# Patient Record
Sex: MALE | Race: WHITE
[De-identification: names, ages, dates, MRNs, and addresses within clinical notes are randomized per-mention and may not be internally consistent; named-entity substitution may affect disease eponyms.]

---

## 2017-03-16 ENCOUNTER — PRIOR ORIGINAL RECORDS (OUTPATIENT)
Dept: OTHER | Age: 62
End: 2017-03-16

## 2017-06-06 ENCOUNTER — PRIOR ORIGINAL RECORDS (OUTPATIENT)
Dept: OTHER | Age: 62
End: 2017-06-06

## 2017-06-08 ENCOUNTER — PRIOR ORIGINAL RECORDS (OUTPATIENT)
Dept: OTHER | Age: 62
End: 2017-06-08

## 2017-08-31 ENCOUNTER — PRIOR ORIGINAL RECORDS (OUTPATIENT)
Dept: OTHER | Age: 62
End: 2017-08-31

## 2017-09-19 LAB
ALT (SGPT): 47 U/L
AST (SGOT): 33 U/L
BUN: 11 MG/DL
BUN: 16 MG/DL
BUN: 20 MG/DL
CALCIUM: 9.4 MG/DL
CALCIUM: 9.4 MG/DL
CHLORIDE: 101 MEQ/L
CHLORIDE: 106 MEQ/L
CHOLESTEROL, TOTAL: 149 MG/DL
CREATININE, SERUM: 0.78 MG/DL
CREATININE, SERUM: 0.83 MG/DL
CREATININE, SERUM: 0.93 MG/DL
GLUCOSE: 118 MG/DL
GLUCOSE: 118 MG/DL
GLUCOSE: 172 MG/DL
HDL CHOLESTEROL: 34 MG/DL
HEMATOCRIT: 41.6 %
HEMATOCRIT: 42.1 %
HEMOGLOBIN: 14.4 G/DL
HEMOGLOBIN: 14.6 G/DL
LDL CHOLESTEROL: 82 MG/DL
PLATELETS: 239 K/UL
PLATELETS: 246 K/UL
POTASSIUM, SERUM: 4 MEQ/L
POTASSIUM, SERUM: 4.5 MEQ/L
RED BLOOD COUNT: 4.46 X 10-6/U
RED BLOOD COUNT: 4.48 X 10-6/U
SODIUM: 139 MEQ/L
SODIUM: 142 MEQ/L
TOTAL CHOLESTEROL / HDL RATIO: 4.4 RATIO UN
TRIGLYCERIDES: 163 MG/DL
WHITE BLOOD COUNT: 7.06 X 10-3/U
WHITE BLOOD COUNT: 7.25 X 10-3/U

## 2017-09-28 ENCOUNTER — MYAURORA ACCOUNT LINK (OUTPATIENT)
Dept: OTHER | Age: 62
End: 2017-09-28

## 2017-10-06 ENCOUNTER — PRIOR ORIGINAL RECORDS (OUTPATIENT)
Dept: OTHER | Age: 62
End: 2017-10-06

## 2017-10-11 PROBLEM — M75.81 ROTATOR CUFF TENDONITIS, RIGHT: Status: ACTIVE | Noted: 2017-10-11

## 2017-10-11 PROBLEM — M75.01 ADHESIVE CAPSULITIS OF RIGHT SHOULDER: Status: ACTIVE | Noted: 2017-10-11

## 2017-10-12 ENCOUNTER — PRIOR ORIGINAL RECORDS (OUTPATIENT)
Dept: OTHER | Age: 62
End: 2017-10-12

## 2017-10-12 ENCOUNTER — MYAURORA ACCOUNT LINK (OUTPATIENT)
Dept: OTHER | Age: 62
End: 2017-10-12

## 2019-01-15 PROBLEM — G89.4 CHRONIC PAIN DISORDER: Status: ACTIVE | Noted: 2019-01-15

## 2019-01-15 PROBLEM — R03.0 ELEVATED BP WITHOUT DIAGNOSIS OF HYPERTENSION: Status: ACTIVE | Noted: 2019-01-15

## 2019-01-15 PROBLEM — E66.3 OVERWEIGHT (BMI 25.0-29.9): Status: ACTIVE | Noted: 2019-01-15

## 2019-01-15 PROBLEM — E78.2 MIXED HYPERLIPIDEMIA: Status: ACTIVE | Noted: 2019-01-15

## 2019-01-15 PROBLEM — M62.838 MUSCLE SPASM: Status: ACTIVE | Noted: 2019-01-15

## 2019-01-16 PROBLEM — F17.210 TOBACCO DEPENDENCE DUE TO CIGARETTES: Status: ACTIVE | Noted: 2019-01-16

## 2019-01-16 PROBLEM — I73.9 PERIPHERAL VASCULAR DISEASE: Status: ACTIVE | Noted: 2019-01-16

## 2019-01-16 PROBLEM — I73.9 PERIPHERAL VASCULAR DISEASE (HCC): Status: ACTIVE | Noted: 2019-01-16

## 2019-01-23 ENCOUNTER — TELEPHONE (OUTPATIENT)
Dept: WOUND CARE | Facility: HOSPITAL | Age: 64
End: 2019-01-23

## 2019-01-23 DIAGNOSIS — E78.2 MIXED HYPERLIPIDEMIA: Primary | ICD-10-CM

## 2019-01-23 RX ORDER — ACETAMINOPHEN 160 MG
2000 TABLET,DISINTEGRATING ORAL DAILY
Qty: 30 CAPSULE | Refills: 2 | Status: SHIPPED | OUTPATIENT
Start: 2019-01-23 | End: 2019-04-23

## 2019-01-23 RX ORDER — ROSUVASTATIN CALCIUM 10 MG/1
10 TABLET, COATED ORAL NIGHTLY
Qty: 30 TABLET | Refills: 2 | Status: SHIPPED | OUTPATIENT
Start: 2019-01-23 | End: 2019-04-23 | Stop reason: WASHOUT

## 2019-01-23 RX ORDER — UBIDECARENONE 200 MG
CAPSULE ORAL
Qty: 30 CAPSULE | Refills: 2 | Status: SHIPPED | OUTPATIENT
Start: 2019-01-23 | End: 2021-04-20 | Stop reason: ALTCHOICE

## 2019-02-28 VITALS
SYSTOLIC BLOOD PRESSURE: 134 MMHG | BODY MASS INDEX: 26.07 KG/M2 | HEART RATE: 56 BPM | DIASTOLIC BLOOD PRESSURE: 64 MMHG | HEIGHT: 68 IN | WEIGHT: 172 LBS

## 2019-02-28 VITALS
SYSTOLIC BLOOD PRESSURE: 130 MMHG | HEIGHT: 68 IN | HEART RATE: 72 BPM | BODY MASS INDEX: 25.76 KG/M2 | WEIGHT: 170 LBS | DIASTOLIC BLOOD PRESSURE: 70 MMHG

## 2021-04-20 PROBLEM — I10 ESSENTIAL HYPERTENSION: Status: ACTIVE | Noted: 2021-04-20

## 2021-04-23 ENCOUNTER — IMMUNIZATION (OUTPATIENT)
Dept: LAB | Age: 66
End: 2021-04-23
Attending: HOSPITALIST
Payer: MEDICARE

## 2021-04-23 ENCOUNTER — HOSPITAL ENCOUNTER (OUTPATIENT)
Dept: CT IMAGING | Facility: HOSPITAL | Age: 66
Discharge: HOME | End: 2021-04-23
Attending: INTERNAL MEDICINE
Payer: MEDICARE

## 2021-04-23 DIAGNOSIS — Z12.2 ENCOUNTER FOR SCREENING FOR LUNG CANCER: ICD-10-CM

## 2021-04-23 DIAGNOSIS — Z23 NEED FOR VACCINATION: Primary | ICD-10-CM

## 2021-04-23 DIAGNOSIS — F17.200 CURRENT SMOKER: ICD-10-CM

## 2021-04-23 PROCEDURE — 71271 CT THORAX LUNG CANCER SCR C-: CPT | Performed by: INTERNAL MEDICINE

## 2021-04-23 PROCEDURE — 0001A SARSCOV2 VAC 30MCG/0.3ML IM: CPT

## 2021-05-14 ENCOUNTER — IMMUNIZATION (OUTPATIENT)
Dept: LAB | Age: 66
End: 2021-05-14
Attending: HOSPITALIST
Payer: MEDICARE

## 2021-05-14 DIAGNOSIS — Z23 NEED FOR VACCINATION: Primary | ICD-10-CM

## 2021-05-14 PROCEDURE — 0002A SARSCOV2 VAC 30MCG/0.3ML IM: CPT

## 2021-09-24 ENCOUNTER — OFFICE VISIT (OUTPATIENT)
Dept: SURGERY | Facility: CLINIC | Age: 66
End: 2021-09-24
Payer: COMMERCIAL

## 2021-09-24 DIAGNOSIS — R79.89 LOW TESTOSTERONE: ICD-10-CM

## 2021-09-24 DIAGNOSIS — N52.9 ERECTILE DYSFUNCTION, UNSPECIFIED ERECTILE DYSFUNCTION TYPE: ICD-10-CM

## 2021-09-24 DIAGNOSIS — R82.90 URINE FINDING: Primary | ICD-10-CM

## 2021-09-24 LAB
APPEARANCE: CLEAR
BILIRUBIN: NEGATIVE
GLUCOSE (URINE DIPSTICK): NEGATIVE MG/DL
KETONES (URINE DIPSTICK): NEGATIVE MG/DL
LEUKOCYTES: NEGATIVE
MULTISTIX LOT#: NORMAL NUMERIC
NITRITE, URINE: NEGATIVE
OCCULT BLOOD: NEGATIVE
PH, URINE: 7 (ref 4.5–8)
PROTEIN (URINE DIPSTICK): NEGATIVE MG/DL
SPECIFIC GRAVITY: 1.02 (ref 1–1.03)
URINE-COLOR: YELLOW
UROBILINOGEN,SEMI-QN: 0.2 MG/DL (ref 0–1.9)

## 2021-09-24 PROCEDURE — 99203 OFFICE O/P NEW LOW 30 MIN: CPT | Performed by: UROLOGY

## 2021-09-24 PROCEDURE — 81003 URINALYSIS AUTO W/O SCOPE: CPT | Performed by: UROLOGY

## 2021-09-24 NOTE — PROGRESS NOTES
Rooming Clinician: Gabriel Leon is a 61year old male. Patient presents with:  Consult: referred by pcp for low T 225        HPI:     Patient comes for evaluation of low energy and erectile dysfunction.   He states a history of low testosterone many 2017    Right calf   • LEG/ANKLE SURGERY PROC UNLISTED Right 1990's    Attached ligaments with anchor      Social History:  Social History    Tobacco Use      Smoking status: Current Every Day Smoker        Packs/day: 1.00        Years: 42.00        Pack y that his biggest issue was low energy and would like to resume testosterone replacement therapy. We discussed various options. He will initiate self injection therapy using 200 mg IM twice monthly. I discussed the testosterone levels with the patient.

## 2021-09-24 NOTE — PATIENT INSTRUCTIONS
Evaluating Erectile Dysfunction     Doctor talking to man. Many men feel embarrassed to talk to a healthcare provider about erectile dysfunction (ED). This common problem can be treated, but only if your provider knows about it.  Your provider will like And counseling may be recommended to talk about underlying emotional issues. Tera last reviewed this educational content on 7/1/2019  © 7468-4369 [employer]. All rights reserved.  This information is not intended as a substitute for pro professional medical care. Always follow your healthcare professional's instructions. Total Testosterone  Does this test have other names?   Testosterone (total), serum testosterone  What is this test?  This test measures the level of the hormone nathan hypogonadism. The FDA currently advises against treating men with low testosterone caused only by aging. Why do I need this test?  You may need this test if you have symptoms of low testosterone.   Symptoms of low testosterone in men include:  · Large sammie morning. This is because testosterone levels tend to be highest at that time. But you may need to have this test more than once, and at different times of the day, to confirm low testosterone levels.  This is because your testosterone level can change from

## 2021-09-27 ENCOUNTER — TELEPHONE (OUTPATIENT)
Dept: SURGERY | Facility: CLINIC | Age: 66
End: 2021-09-27

## 2021-09-27 NOTE — TELEPHONE ENCOUNTER
This RN faxed the order of Testosterone Cypionate to [facility] on 9/27/21:     Testosterone Cypionate 200mg/ml  To inject 1ml IM twice a month  Syringes 22G 1 1/2  Refills: 6

## 2022-01-06 ENCOUNTER — IMMUNIZATION (OUTPATIENT)
Dept: LAB | Facility: HOSPITAL | Age: 67
End: 2022-01-06
Attending: EMERGENCY MEDICINE
Payer: MEDICARE

## 2022-01-06 DIAGNOSIS — Z23 NEED FOR VACCINATION: Primary | ICD-10-CM

## 2022-01-06 PROCEDURE — 0054A SARSCOV2 VAC 30MCG/0.3ML IM: CPT

## 2022-01-06 PROCEDURE — 0004A SARSCOV2 VAC 30MCG/0.3ML IM: CPT

## 2022-04-28 ENCOUNTER — TELEPHONE (OUTPATIENT)
Dept: SURGERY | Facility: CLINIC | Age: 67
End: 2022-04-28

## 2022-04-28 NOTE — TELEPHONE ENCOUNTER
Received refill request from [facility] for testosterone. LOV visit was 9/24/2021 and it was rec that he have a 3 month f/u.  mcm sent to the patient encouraging him to make f/u appt and refill can be reassessed.

## 2022-04-30 ENCOUNTER — HOSPITAL ENCOUNTER (OUTPATIENT)
Dept: CT IMAGING | Facility: HOSPITAL | Age: 67
Discharge: HOME | End: 2022-04-30
Attending: INTERNAL MEDICINE
Payer: MEDICARE

## 2022-04-30 DIAGNOSIS — Z72.0 TOBACCO USE: ICD-10-CM

## 2022-04-30 DIAGNOSIS — F17.200 CURRENT SMOKER: ICD-10-CM

## 2022-04-30 PROCEDURE — 71271 CT THORAX LUNG CANCER SCR C-: CPT | Performed by: INTERNAL MEDICINE

## 2022-05-05 NOTE — TELEPHONE ENCOUNTER
Received fax for Rx refill request 90 day supply for the patient         Testosterone cypionate injectable 200 MG/ML Intramuscular Solution, QUANTITY 10  INJECT 1 ML INTRAMUSCULARLY TWICE A MONTH    Special instructions:  10 SYRN 22 G 1 1/2 AND 10 DRAW 18 G

## 2022-05-06 RX ORDER — TESTOSTERONE CYPIONATE 200 MG/ML
200 INJECTION INTRAMUSCULAR
Qty: 10 ML | Refills: 0 | Status: SHIPPED | OUTPATIENT
Start: 2022-05-06

## 2022-06-03 ENCOUNTER — OFFICE VISIT (OUTPATIENT)
Dept: SURGERY | Facility: CLINIC | Age: 67
End: 2022-06-03
Payer: COMMERCIAL

## 2022-06-03 DIAGNOSIS — R82.90 URINE FINDING: Primary | ICD-10-CM

## 2022-06-03 DIAGNOSIS — N52.9 ERECTILE DYSFUNCTION, UNSPECIFIED ERECTILE DYSFUNCTION TYPE: ICD-10-CM

## 2022-06-03 DIAGNOSIS — R79.89 LOW TESTOSTERONE: ICD-10-CM

## 2022-06-03 LAB
APPEARANCE: CLEAR
BILIRUBIN: NEGATIVE
GLUCOSE (URINE DIPSTICK): NEGATIVE MG/DL
KETONES (URINE DIPSTICK): NEGATIVE MG/DL
LEUKOCYTES: NEGATIVE
MULTISTIX LOT#: ABNORMAL NUMERIC
NITRITE, URINE: NEGATIVE
PH, URINE: 5.5 (ref 4.5–8)
PROTEIN (URINE DIPSTICK): 30 MG/DL
SPECIFIC GRAVITY: >=1.03 (ref 1–1.03)
UROBILINOGEN,SEMI-QN: 1 MG/DL (ref 0–1.9)

## 2022-06-03 PROCEDURE — 99213 OFFICE O/P EST LOW 20 MIN: CPT | Performed by: UROLOGY

## 2022-06-03 PROCEDURE — 81003 URINALYSIS AUTO W/O SCOPE: CPT | Performed by: UROLOGY

## 2022-09-09 ENCOUNTER — HOSPITAL ENCOUNTER (OUTPATIENT)
Dept: MRI IMAGING | Age: 67
Discharge: HOME | End: 2022-09-09
Attending: INTERNAL MEDICINE
Payer: MEDICARE

## 2022-09-09 DIAGNOSIS — R51.9 CHRONIC INTRACTABLE HEADACHE, UNSPECIFIED HEADACHE TYPE: ICD-10-CM

## 2022-09-09 DIAGNOSIS — G89.29 CHRONIC INTRACTABLE HEADACHE, UNSPECIFIED HEADACHE TYPE: ICD-10-CM

## 2022-09-09 PROCEDURE — 70551 MRI BRAIN STEM W/O DYE: CPT | Performed by: INTERNAL MEDICINE

## 2022-10-14 PROCEDURE — 99284 EMERGENCY DEPT VISIT MOD MDM: CPT

## 2022-10-14 PROCEDURE — 99283 EMERGENCY DEPT VISIT LOW MDM: CPT

## 2022-10-15 ENCOUNTER — HOSPITAL ENCOUNTER (EMERGENCY)
Facility: HOSPITAL | Age: 67
Discharge: HOME | End: 2022-10-15
Attending: EMERGENCY MEDICINE
Payer: MEDICARE

## 2022-10-15 VITALS
BODY MASS INDEX: 27 KG/M2 | DIASTOLIC BLOOD PRESSURE: 57 MMHG | HEART RATE: 66 BPM | TEMPERATURE: 98 F | SYSTOLIC BLOOD PRESSURE: 133 MMHG | RESPIRATION RATE: 18 BRPM | WEIGHT: 176 LBS | OXYGEN SATURATION: 97 %

## 2022-10-15 DIAGNOSIS — M54.2 NECK PAIN: Primary | ICD-10-CM

## 2022-10-15 PROCEDURE — 96372 THER/PROPH/DIAG INJ SC/IM: CPT

## 2022-10-15 RX ORDER — KETOROLAC TROMETHAMINE 30 MG/ML
30 INJECTION, SOLUTION INTRAMUSCULAR; INTRAVENOUS ONCE
Status: COMPLETED | OUTPATIENT
Start: 2022-10-15 | End: 2022-10-15

## 2022-10-15 RX ORDER — MORPHINE SULFATE 4 MG/ML
4 INJECTION, SOLUTION INTRAMUSCULAR; INTRAVENOUS ONCE
Status: COMPLETED | OUTPATIENT
Start: 2022-10-15 | End: 2022-10-15

## 2022-10-15 NOTE — ED INITIAL ASSESSMENT (HPI)
Pt had cervical spine procedure a month ago, has been seeing pain specialist. Seen pain dr today and was prescribed medication, although reports has been unable to  rx form pharm

## 2022-10-17 ENCOUNTER — PATIENT OUTREACH (OUTPATIENT)
Dept: CASE MANAGEMENT | Age: 67
End: 2022-10-17

## 2022-10-17 NOTE — PROGRESS NOTES
VM received; pt spouse, Henrik Woods requesting assistance w/scheduling apt (dc 10/15)    Dr David Erwin, Rehabilitation  [address] [place] [address]  [phone]  Follow up 1 week

## 2022-10-17 NOTE — PROGRESS NOTES
Dr Gigi Eldridge, Rehabilitation  [address] [place] [id number] [address] [address]   [phone]  Follow up 1 week  [phone]  Apt: Nov 11 @ 3pm   Put on waitlist    Confirmed w/ pt wife Edward Alicia encounter

## 2023-01-12 RX ORDER — TESTOSTERONE CYPIONATE 200 MG/ML
200 INJECTION INTRAMUSCULAR
Qty: 10 ML | Refills: 0 | Status: SHIPPED | OUTPATIENT
Start: 2023-01-12

## 2023-01-12 NOTE — TELEPHONE ENCOUNTER
RN received a refill request of Testosterone Cyp 200mg (twice a month) from [facility]. Forwarded to PATEJA to review and approve    Patient's last office visit, 6/3. RN reached out to patient via [employer] to set up appt with Dr Keny Yip and lab draw due.

## 2023-01-17 ENCOUNTER — TELEPHONE (OUTPATIENT)
Dept: SURGERY | Facility: CLINIC | Age: 68
End: 2023-01-17

## 2023-01-17 DIAGNOSIS — R79.89 LOW TESTOSTERONE: Primary | ICD-10-CM

## 2023-01-17 NOTE — TELEPHONE ENCOUNTER
RN received letter from [facility] for Testosterone Cyp 100mg refill request. MARIJA suggested lab draws first before refills.  Patient made aware via [employer]

## 2023-02-13 LAB
% FREE PSA: 67 % (CALC)
ABSOLUTE BASOPHILS: 62 CELLS/UL (ref 0–200)
ABSOLUTE EOSINOPHILS: 405 CELLS/UL (ref 15–500)
ABSOLUTE LYMPHOCYTES: 2746 CELLS/UL (ref 850–3900)
ABSOLUTE MONOCYTES: 563 CELLS/UL (ref 200–950)
ABSOLUTE NEUTROPHILS: 5025 CELLS/UL (ref 1500–7800)
BASOPHILS: 0.7 %
EOSINOPHILS: 4.6 %
FREE PSA: 0.6 NG/ML
HEMATOCRIT: 49.3 % (ref 38.5–50)
HEMOGLOBIN: 16.9 G/DL (ref 13.2–17.1)
LYMPHOCYTES: 31.2 %
MCH: 33.6 PG (ref 27–33)
MCHC: 34.3 G/DL (ref 32–36)
MCV: 98 FL (ref 80–100)
MONOCYTES: 6.4 %
MPV: 9.8 FL (ref 7.5–12.5)
NEUTROPHILS: 57.1 %
PLATELET COUNT: 276 THOUSAND/UL (ref 140–400)
RDW: 13 % (ref 11–15)
RED BLOOD CELL COUNT: 5.03 MILLION/UL (ref 4.2–5.8)
TOTAL PSA: 0.9 NG/ML
WHITE BLOOD CELL COUNT: 8.8 THOUSAND/UL (ref 3.8–10.8)

## 2023-05-06 ENCOUNTER — HOSPITAL ENCOUNTER (OUTPATIENT)
Dept: CT IMAGING | Facility: HOSPITAL | Age: 68
Discharge: HOME | End: 2023-05-06
Attending: INTERNAL MEDICINE
Payer: MEDICARE

## 2023-05-06 DIAGNOSIS — Z12.2 ENCOUNTER FOR SCREENING FOR LUNG CANCER: ICD-10-CM

## 2023-05-06 DIAGNOSIS — F17.200 SMOKER: ICD-10-CM

## 2023-05-06 PROCEDURE — 71271 CT THORAX LUNG CANCER SCR C-: CPT | Performed by: INTERNAL MEDICINE

## 2023-08-18 ENCOUNTER — HOSPITAL ENCOUNTER (OUTPATIENT)
Dept: MRI IMAGING | Facility: HOSPITAL | Age: 68
Discharge: HOME | End: 2023-08-18
Attending: ANESTHESIOLOGY
Payer: MEDICARE

## 2023-08-18 DIAGNOSIS — M51.34 DEGENERATIVE DISC DISEASE, THORACIC: ICD-10-CM

## 2023-08-18 PROCEDURE — 72156 MRI NECK SPINE W/O & W/DYE: CPT | Performed by: ANESTHESIOLOGY

## 2023-08-18 PROCEDURE — A9575 INJ GADOTERATE MEGLUMI 0.1ML: HCPCS | Performed by: RADIOLOGY

## 2023-08-18 PROCEDURE — 72157 MRI CHEST SPINE W/O & W/DYE: CPT | Performed by: ANESTHESIOLOGY

## 2023-08-18 RX ORDER — GADOTERATE MEGLUMINE 376.9 MG/ML
15 INJECTION INTRAVENOUS
Status: COMPLETED | OUTPATIENT
Start: 2023-08-18 | End: 2023-08-18

## 2023-08-18 RX ADMIN — GADOTERATE MEGLUMINE 14 ML: 376.9 INJECTION INTRAVENOUS at 14:11:00

## 2023-11-20 ENCOUNTER — HOSPITAL ENCOUNTER (OUTPATIENT)
Dept: ULTRASOUND IMAGING | Age: 68
Discharge: HOME | End: 2023-11-20
Attending: INTERNAL MEDICINE
Payer: MEDICARE

## 2023-11-20 ENCOUNTER — LAB ENCOUNTER (OUTPATIENT)
Dept: LAB | Age: 68
End: 2023-11-20
Attending: INTERNAL MEDICINE
Payer: MEDICARE

## 2023-11-20 DIAGNOSIS — R09.89 RIGHT CAROTID BRUIT: ICD-10-CM

## 2023-11-20 DIAGNOSIS — D64.9 ANEMIA, UNSPECIFIED TYPE: ICD-10-CM

## 2023-11-20 DIAGNOSIS — E78.2 MIXED HYPERLIPIDEMIA: ICD-10-CM

## 2023-11-20 DIAGNOSIS — E03.9 PRIMARY HYPOTHYROIDISM: ICD-10-CM

## 2023-11-20 DIAGNOSIS — R73.9 HYPERGLYCEMIA: ICD-10-CM

## 2023-11-20 LAB
ALBUMIN SERPL-MCNC: 3.7 G/DL (ref 3.4–5)
ALBUMIN/GLOB SERPL: 1.2 {RATIO} (ref 1–2)
ALP LIVER SERPL-CCNC: 43 U/L
ALT SERPL-CCNC: 37 U/L
ANION GAP SERPL CALC-SCNC: 2 MMOL/L (ref 0–18)
AST SERPL-CCNC: 25 U/L (ref 15–37)
BASOPHILS # BLD AUTO: 0.08 X10(3) UL (ref 0–0.2)
BASOPHILS NFR BLD AUTO: 0.9 %
BILIRUB SERPL-MCNC: 0.7 MG/DL (ref 0.1–2)
BUN BLD-MCNC: 16 MG/DL (ref 9–23)
CALCIUM BLD-MCNC: 9 MG/DL (ref 8.5–10.1)
CHLORIDE SERPL-SCNC: 109 MMOL/L (ref 98–112)
CHOLEST SERPL-MCNC: 133 MG/DL (ref ?–200)
CO2 SERPL-SCNC: 30 MMOL/L (ref 21–32)
CREAT BLD-MCNC: 1.03 MG/DL
EGFRCR SERPLBLD CKD-EPI 2021: 80 ML/MIN/1.73M2 (ref 60–?)
EOSINOPHIL # BLD AUTO: 0.52 X10(3) UL (ref 0–0.7)
EOSINOPHIL NFR BLD AUTO: 6.1 %
ERYTHROCYTE [DISTWIDTH] IN BLOOD BY AUTOMATED COUNT: 13 %
EST. AVERAGE GLUCOSE BLD GHB EST-MCNC: 120 MG/DL (ref 68–126)
FASTING PATIENT LIPID ANSWER: YES
FASTING STATUS PATIENT QL REPORTED: YES
GLOBULIN PLAS-MCNC: 3 G/DL (ref 2.8–4.4)
GLUCOSE BLD-MCNC: 105 MG/DL (ref 70–99)
HBA1C MFR BLD: 5.8 % (ref ?–5.7)
HCT VFR BLD AUTO: 47.1 %
HDLC SERPL-MCNC: 28 MG/DL (ref 40–59)
HGB BLD-MCNC: 15.8 G/DL
IMM GRANULOCYTES # BLD AUTO: 0.02 X10(3) UL (ref 0–1)
IMM GRANULOCYTES NFR BLD: 0.2 %
LDLC SERPL CALC-MCNC: 79 MG/DL (ref ?–100)
LYMPHOCYTES # BLD AUTO: 2.83 X10(3) UL (ref 1–4)
LYMPHOCYTES NFR BLD AUTO: 33 %
MCH RBC QN AUTO: 32 PG (ref 26–34)
MCHC RBC AUTO-ENTMCNC: 33.5 G/DL (ref 31–37)
MCV RBC AUTO: 95.5 FL
MONOCYTES # BLD AUTO: 0.57 X10(3) UL (ref 0.1–1)
MONOCYTES NFR BLD AUTO: 6.7 %
NEUTROPHILS # BLD AUTO: 4.55 X10 (3) UL (ref 1.5–7.7)
NEUTROPHILS # BLD AUTO: 4.55 X10(3) UL (ref 1.5–7.7)
NEUTROPHILS NFR BLD AUTO: 53.1 %
NONHDLC SERPL-MCNC: 105 MG/DL (ref ?–130)
OSMOLALITY SERPL CALC.SUM OF ELEC: 294 MOSM/KG (ref 275–295)
PLATELET # BLD AUTO: 232 10(3)UL (ref 150–450)
POTASSIUM SERPL-SCNC: 3.9 MMOL/L (ref 3.5–5.1)
PROT SERPL-MCNC: 6.7 G/DL (ref 6.4–8.2)
RBC # BLD AUTO: 4.93 X10(6)UL
SODIUM SERPL-SCNC: 141 MMOL/L (ref 136–145)
TRIGL SERPL-MCNC: 148 MG/DL (ref 30–149)
TSI SER-ACNC: 1.04 MIU/ML (ref 0.36–3.74)
VLDLC SERPL CALC-MCNC: 23 MG/DL (ref 0–30)
WBC # BLD AUTO: 8.6 X10(3) UL (ref 4–11)

## 2023-11-20 PROCEDURE — 80053 COMPREHEN METABOLIC PANEL: CPT

## 2023-11-20 PROCEDURE — 83036 HEMOGLOBIN GLYCOSYLATED A1C: CPT

## 2023-11-20 PROCEDURE — 85025 COMPLETE CBC W/AUTO DIFF WBC: CPT

## 2023-11-20 PROCEDURE — 80061 LIPID PANEL: CPT

## 2023-11-20 PROCEDURE — 84443 ASSAY THYROID STIM HORMONE: CPT

## 2023-11-20 PROCEDURE — 93880 EXTRACRANIAL BILAT STUDY: CPT | Performed by: INTERNAL MEDICINE

## 2023-11-21 NOTE — PROGRESS NOTES
I will discuss results with the patient in person at the next visit- Appt scheduled - date verified by me.  Oralia Rizo,

## 2023-12-01 ENCOUNTER — TELEPHONE (OUTPATIENT)
Dept: SURGERY | Facility: HOSPITAL | Age: 68
End: 2023-12-01

## 2023-12-01 NOTE — TELEPHONE ENCOUNTER
ASSESSMENT AND PLAN:  Esther Reddy is a 79year old male with HCV. Noted HCV Ab+ at blood donation. History of HCV with IFN Riba Rx in the past. States was treated. Has normal liver enzymes. May have false positive HCV Ab seen after Rx. Will check HCV PCR and plan Rx if positive cw relapse. 1. Check HCV PCR, Fibrosure. 2. If negative then cw prior HCV Rx and no chronic HCV. 3. If negative, will make plans for therapy.

## 2023-12-05 ENCOUNTER — TELEPHONE (OUTPATIENT)
Dept: SURGERY | Facility: HOSPITAL | Age: 68
End: 2023-12-05

## 2023-12-05 NOTE — TELEPHONE ENCOUNTER
HCV PCR positive Genotype 3  Moderate scarring. Other labs avail in [facility]    1. Start Epclusa x 12 weeks. Rx sent to Pharmacy. 2.  Needs Ultrasound. Ordered. Call [phone] to schedule    Results given to patient.   Sent to [facility] [facility]

## 2024-09-27 PROBLEM — J41.0 SMOKERS' COUGH (HCC): Chronic | Status: ACTIVE | Noted: 2024-09-27

## 2024-10-04 ENCOUNTER — HOSPITAL ENCOUNTER (OUTPATIENT)
Dept: MRI IMAGING | Facility: HOSPITAL | Age: 69
Discharge: HOME | End: 2024-10-04
Attending: INTERNAL MEDICINE
Payer: MEDICARE

## 2024-10-04 DIAGNOSIS — R29.898 WEAKNESS OF LEFT ARM: ICD-10-CM

## 2024-10-04 DIAGNOSIS — R20.0 NUMBNESS AND TINGLING OF LEFT SIDE OF FACE: ICD-10-CM

## 2024-10-04 DIAGNOSIS — R20.2 NUMBNESS AND TINGLING OF LEFT SIDE OF FACE: ICD-10-CM

## 2024-10-04 DIAGNOSIS — I63.81 CEREBROVASCULAR ACCIDENT (CVA) DUE TO OCCLUSION OF SMALL ARTERY (HCC): ICD-10-CM

## 2024-10-04 PROCEDURE — 70551 MRI BRAIN STEM W/O DYE: CPT | Performed by: INTERNAL MEDICINE

## 2024-11-22 ENCOUNTER — HOSPITAL ENCOUNTER (OUTPATIENT)
Dept: CV DIAGNOSTICS | Facility: HOSPITAL | Age: 69
Discharge: HOME | End: 2024-11-22
Attending: INTERNAL MEDICINE
Payer: MEDICARE

## 2024-11-22 DIAGNOSIS — R06.02 SOB (SHORTNESS OF BREATH): ICD-10-CM

## 2024-11-22 PROCEDURE — 78452 HT MUSCLE IMAGE SPECT MULT: CPT | Performed by: INTERNAL MEDICINE

## 2024-11-22 PROCEDURE — 93017 CV STRESS TEST TRACING ONLY: CPT | Performed by: INTERNAL MEDICINE

## 2024-11-22 PROCEDURE — 93018 CV STRESS TEST I&R ONLY: CPT | Performed by: INTERNAL MEDICINE

## 2024-11-22 RX ORDER — REGADENOSON 0.08 MG/ML
INJECTION, SOLUTION INTRAVENOUS
Status: DISPENSED
Start: 2024-11-22 | End: 2024-11-22

## 2024-12-14 ENCOUNTER — HOSPITAL ENCOUNTER (OUTPATIENT)
Dept: GENERAL RADIOLOGY | Facility: HOSPITAL | Age: 69
Discharge: HOME | End: 2024-12-14
Attending: INTERNAL MEDICINE
Payer: MEDICARE

## 2024-12-14 ENCOUNTER — APPOINTMENT (OUTPATIENT)
Dept: CT IMAGING | Facility: HOSPITAL | Age: 69
End: 2024-12-14
Attending: INTERNAL MEDICINE
Payer: MEDICARE

## 2024-12-14 DIAGNOSIS — Z01.818 PREOP EXAMINATION: ICD-10-CM

## 2024-12-14 DIAGNOSIS — R94.39 ABNORMAL CARDIOVASCULAR STRESS TEST: ICD-10-CM

## 2024-12-14 PROCEDURE — 71046 X-RAY EXAM CHEST 2 VIEWS: CPT | Performed by: INTERNAL MEDICINE

## 2024-12-18 NOTE — PAT NURSING NOTE
PreOp Instructions     You are scheduled for: a Cardiac Procedure     Date of Procedure: 12/19/24     Diet Instructions: Do not eat or drink anything after midnight including gum, mints, candy, etc the night before your procedure.     Medications: Take Aspirin 81 mg x 4 tablets the morning of your procedure. Medications you are allowed to take can be taken with a sip of water the morning of your procedure.     Medications to Stop: DO NOT TAKE any herbal supplements and vitamins the morning of your procedure.     Other Medications: DO NOT TAKE Losartan/Hydrochlorothiazide the morning of your procedure. DO NOT TAKE Sildenafil for 24 hours prior to your procedure.     Skin Prep : Shower with antibacterial soap using a clean washcloth, prior to procedure. Once dried off, no lotions/powders/creams/ointments, etc.     Arrival Time: You will receive a phone call later today between 3:00 pm- 6:00 pm with your arrival time. If you haven't received a phone call, please check your voicemail messages., If you did not receive a voice mail and it is after 6:00 pm, please call the nursing supervisor at [phone].    Driving After Procedure: Sedation will be given so you WILL NOT be able to drive home. You will need a responsible adult  to drive you home. You can NOT take uber or taxi unless approved by your physician in advance.     Discharge Teaching: Your nurse will give you specific instructions before discharge, Most people can resume normal activities in 2-3 days, Any questions, please call the physician's office

## 2024-12-19 ENCOUNTER — HOSPITAL ENCOUNTER (OUTPATIENT)
Dept: INTERVENTIONAL RADIOLOGY/VASCULAR | Facility: HOSPITAL | Age: 69
Discharge: HOME | End: 2024-12-19
Attending: INTERNAL MEDICINE | Admitting: INTERNAL MEDICINE
Payer: MEDICARE

## 2024-12-19 VITALS
DIASTOLIC BLOOD PRESSURE: 62 MMHG | SYSTOLIC BLOOD PRESSURE: 142 MMHG | OXYGEN SATURATION: 98 % | TEMPERATURE: 98 F | RESPIRATION RATE: 12 BRPM | BODY MASS INDEX: 23.79 KG/M2 | HEIGHT: 68 IN | WEIGHT: 157 LBS | HEART RATE: 54 BPM

## 2024-12-19 DIAGNOSIS — R94.39 ABNORMAL STRESS TEST: ICD-10-CM

## 2024-12-19 PROCEDURE — B2111ZZ FLUOROSCOPY OF MULTIPLE CORONARY ARTERIES USING LOW OSMOLAR CONTRAST: ICD-10-PCS | Performed by: INTERNAL MEDICINE

## 2024-12-19 PROCEDURE — 4A023N7 MEASUREMENT OF CARDIAC SAMPLING AND PRESSURE, LEFT HEART, PERCUTANEOUS APPROACH: ICD-10-PCS | Performed by: INTERNAL MEDICINE

## 2024-12-19 PROCEDURE — 93458 L HRT ARTERY/VENTRICLE ANGIO: CPT | Performed by: INTERNAL MEDICINE

## 2024-12-19 RX ORDER — LIDOCAINE HYDROCHLORIDE 10 MG/ML
INJECTION, SOLUTION EPIDURAL; INFILTRATION; INTRACAUDAL; PERINEURAL
Status: COMPLETED
Start: 2024-12-19 | End: 2024-12-19

## 2024-12-19 RX ORDER — NITROGLYCERIN 20 MG/100ML
INJECTION INTRAVENOUS
Status: COMPLETED
Start: 2024-12-19 | End: 2024-12-19

## 2024-12-19 RX ORDER — IOPAMIDOL 755 MG/ML
200 INJECTION, SOLUTION INTRAVASCULAR
Status: COMPLETED | OUTPATIENT
Start: 2024-12-19 | End: 2024-12-19

## 2024-12-19 RX ORDER — SODIUM CHLORIDE 9 MG/ML
INJECTION, SOLUTION INTRAVENOUS CONTINUOUS
OUTPATIENT
Start: 2024-12-19 | End: 2024-12-19

## 2024-12-19 RX ORDER — HEPARIN SODIUM 5000 [USP'U]/ML
INJECTION, SOLUTION INTRAVENOUS; SUBCUTANEOUS
Status: COMPLETED
Start: 2024-12-19 | End: 2024-12-19

## 2024-12-19 RX ORDER — VERAPAMIL HYDROCHLORIDE 2.5 MG/ML
INJECTION, SOLUTION INTRAVENOUS
Status: COMPLETED
Start: 2024-12-19 | End: 2024-12-19

## 2024-12-19 RX ORDER — SODIUM CHLORIDE 9 MG/ML
INJECTION, SOLUTION INTRAVENOUS
Status: COMPLETED | OUTPATIENT
Start: 2024-12-19 | End: 2024-12-19

## 2024-12-19 RX ORDER — MIDAZOLAM HYDROCHLORIDE 1 MG/ML
INJECTION INTRAMUSCULAR; INTRAVENOUS
Status: COMPLETED
Start: 2024-12-19 | End: 2024-12-19

## 2024-12-19 RX ADMIN — SODIUM CHLORIDE: 9 INJECTION, SOLUTION INTRAVENOUS at 13:12:00

## 2024-12-19 RX ADMIN — IOPAMIDOL 30 ML: 755 INJECTION, SOLUTION INTRAVASCULAR at 17:38:00

## 2024-12-19 NOTE — INTERVAL H&P NOTE
Pre-op Diagnosis: * No pre-op diagnosis entered *    The above referenced H&P was reviewed by Kelin Downs MD on 12/19/2024, the patient was examined and no significant changes have occurred in the patient's condition since the H&P was performed.  I discussed with the patient and/or legal representative the potential benefits, risks and side effects of this procedure; the likelihood of the patient achieving goals; and potential problems that might occur during recuperation.  I discussed reasonable alternatives to the procedure, including risks, benefits and side effects related to the alternatives and risks related to not receiving this procedure.  We will proceed with procedure as planned.

## 2024-12-19 NOTE — PROCEDURES
[facility]   part of [facility]    Cardiac Cath Procedure Note  Nico Harmon Patient Status:  Outpatient    1955 MRN [id number]   Location [facility] INTERVENTIONAL SUITES Attending Kelin Downs MD   Hosp Day # 0 PCP Lasha Fong MD       Cardiologist: Kelin Downs MD  Primary Proceduralist: Kelin Downs MD  Procedure Performed: [facility]  Date of Procedure: 2024   Indication: Abnormal stress test    Consent:   Informed written consent was obtained from the patient after risk benefits and alternative explained the patient.  Patient agreed to proceed    Sedation  IV conscious sedation was achieved with Versed and fentanyl.  It was administered under my direct supervision.  Hemodynamic monitoring took place throughout the entire procedure by myself in the Cath Lab staff.  3 mg of Versed and 75mcg of Fentanyl were given.  Start Time: 17:12 End Time: 17:29      Description of the procedure: After written informed consent was obtained from the patient, patient was brought to the cardiac catheterization laboratory in a stable condition and fasting state.  Patient was prepped and draped in the usual sterile fashion.  IV conscious sedation was achieved with Versed and fentanyl.  Lidocaine 1% was used to infiltrate the right radial artery for local anesthesia and a 6 Khmer introducer sheath was inserted into the right radial artery.      Specimen sent to: No specimen collected  Estimated blood loss: 10 cc  Closure:  TR band    Left Ventriculography and hemodynamics:   LV EDP 12 mmHg  No gradient across aortic valve    Coronary Angiography  RCA:  Large size vessel, dominant, mild CAD  Left main:  Large, mild luminal irregularities  Left anterior descending:  Large size vessel, mild luminal irregularities  Circumflex:  Large, vessel, no significant obstructive CAD    Assessment:  Mild CAD    Recommendations:    Routine post cath care  Findings discussed with patient  Recommend lifestyle  modifications with diet and exercise.       Kelin Downs MD  12/19/24

## 2024-12-20 NOTE — PROGRESS NOTES
Called pt for follow up post C. Pt stated he was having chest pain. Instructed pt to call Dr Downs' office to notify him-pt verbalized understanding.

## 2024-12-20 NOTE — PLAN OF CARE
Patient had C today with Dr. Downs. Right wrist access site with TR band in place with 8cc air instilled. Site is CDI. Patient denies any numbness or tingling to right hand/fingers. Patient has good O2 pleth on right hand. VSS. Patient denies any pain. Patient tolerating po intake. Dr. Downs @ bedside and spoke with patient. Air intermittently released from TR band until all air removed. TR band removed. Site is soft, CDI, +1 radial pulse. Occlusive dressing applied. Patient completed recovery time. Discharge instructions reviewed. IV D/C'd. Patient discharged to [facility] by wheelchair with belongings. Wife is .

## 2025-02-25 PROBLEM — R42 DIZZINESS: Status: ACTIVE | Noted: 2024-12-06

## 2025-02-25 PROBLEM — R06.02 SOB (SHORTNESS OF BREATH): Status: ACTIVE | Noted: 2024-12-06

## 2025-03-01 ENCOUNTER — HOSPITAL ENCOUNTER (OUTPATIENT)
Dept: ULTRASOUND IMAGING | Facility: HOSPITAL | Age: 70
Discharge: HOME | End: 2025-03-01
Attending: INTERNAL MEDICINE
Payer: MEDICARE

## 2025-03-01 DIAGNOSIS — B18.2 CHRONIC HEPATITIS C WITHOUT HEPATIC COMA (HCC): ICD-10-CM

## 2025-03-01 PROCEDURE — 76705 ECHO EXAM OF ABDOMEN: CPT | Performed by: INTERNAL MEDICINE

## 2025-03-27 ENCOUNTER — PROCEDURE VISIT (OUTPATIENT)
Dept: PHYSICAL MEDICINE AND REHAB | Facility: CLINIC | Age: 70
End: 2025-03-27
Payer: COMMERCIAL

## 2025-03-27 DIAGNOSIS — G60.9 IDIOPATHIC PERIPHERAL NEUROPATHY: ICD-10-CM

## 2025-03-27 PROCEDURE — 95908 NRV CNDJ TST 3-4 STUDIES: CPT | Performed by: PHYSICAL MEDICINE & REHABILITATION

## 2025-03-27 PROCEDURE — 95886 MUSC TEST DONE W/N TEST COMP: CPT | Performed by: PHYSICAL MEDICINE & REHABILITATION

## 2025-03-27 NOTE — PROGRESS NOTES
[facility]  Electromyography Consultation      History of Present Illness:    Dear Dr. Fong,  Thank you for the opportunity to see Nico Harmon for electrodiagnostic consultation today. As you know the patient is a 69 year old male with a chief complaint of numbness of the legs with the feeling of walking on gravel that started from about 8 months ago to possibly several years without injury.  He drinks alcohol 1 or 2 drinks daily. He has chronic back pain on chronic opioids.  He has a long distance over the road . Denies hx of diabetes, denies thyroid issues.     PAST MEDICAL HISTORY:  Past Medical History:    Arthritis    Chronic hepatitis C (HCC)    G3  F 2-3  Rx 1990's    High blood pressure    High cholesterol    HTN (hypertension)    Peripheral vascular disease       SURGICAL HISTORY:  Past Surgical History:   Procedure Laterality Date    Angioplasty (coronary) Right 2017    Right calf    Leg/ankle surgery proc unlisted Right 1990's    Attached ligaments with anchor       SOCIAL HISTORY:   Social History     Occupational History    Not on file   Tobacco Use    Smoking status: Every Day     Current packs/day: 1.00     Average packs/day: 1 pack/day for 42.0 years (42.0 ttl pk-yrs)     Types: Cigarettes    Smokeless tobacco: Never   Vaping Use    Vaping status: Never Used   Substance and Sexual Activity    Alcohol use: Yes     Comment: socially    Drug use: No    Sexual activity: Yes       FAMILY HISTORY:   Family History   Problem Relation Age of Onset    Cancer Father     Cancer Mother         breast    Diabetes Brother     Heart Disorder Brother        CURRENT MEDICATIONS:   Current Outpatient Medications   Medication Sig Dispense Refill    METOPROLOL SUCCINATE ER 25 MG Oral Tablet 24 Hr TAKE 1 TABLET(25 MG) BY MOUTH DAILY 90 tablet 0    SUMAtriptan Succinate (IMITREX) 100 MG Oral Tab Take 1 tablet (100 mg total) by mouth every 2 (two) hours as  needed for Migraine. Use at onset; repeat once after 2 HRS-ONLY 2 IN 24 HR MAX 9 tablet 1    topiramate (TOPAMAX) 25 MG Oral Tab Take 1 tablet (25 mg total) by mouth daily for 7 days, THEN 1 tablet (25 mg total) 2 (two) times daily for 21 days. 49 tablet 0    oxyCODONE-acetaminophen  MG Oral Tab TAKE 1 TABLET BY MOUTH EVERY 24 HOURS AS NEEDED FOR BREAKTHROUGH PAIN. NOT WITHIN 4 HOURS OF NORCO      Testosterone Enanthate Does not apply Powder       losartan-hydroCHLOROthiazide 100-25 MG Oral Tab Take 1 tablet by mouth daily. 90 tablet 1    rosuvastatin 5 MG Oral Tab Take 1 tablet (5 mg total) by mouth nightly. 90 tablet 0    aspirin 81 MG Oral Tab EC Take 1 tablet (81 mg total) by mouth daily.      HYDROcodone-acetaminophen 5-325 MG Oral Tab TAKE 1 TO 2 TABLETS BY MOUTH EVERY 4 TO 6 HOURS AS DIRECTED . MAXIMUM DAILY DOSE IS 8 TABLETS      pregabalin 75 MG Oral Cap Take 1 capsule (75 mg total) by mouth 2 (two) times daily.      fluticasone propionate 50 MCG/ACT Nasal Suspension 1 spray by Each Nare route in the morning and 1 spray before bedtime. 1 each 2    diclofenac (VOLTAREN) 1 % External Gel Apply 2 g topically 4 (four) times daily. 100 g 0    HYDROcodone-acetaminophen 7.5-325 MG Oral Tab TAKE 1 TABLET BY MOUTH EVERY 4 TO 6 HOURS AS NEEDED FOR PAIN. MAX 5/DAY      Sildenafil Citrate 50 MG Oral Tab Take 1 tablet (50 mg total) by mouth daily as needed for Erectile Dysfunction. 30 tablet 5    testosterone cypionate 200 MG/ML Intramuscular Solution Inject 1 mL (200 mg total) into the muscle every 14 (fourteen) days. Use 18 gauge to draw and 22 G to inject. 10 mL 0    ibuprofen 800 MG Oral Tab Take 800 mg by mouth 3 (three) times daily as needed.         PHYSICAL EXAM:   There were no vitals taken for this visit.    There is no height or weight on file to calculate BMI.      General: No immediate distress   Extremities: peripheral pulses intact, no lower extremity edema bilaterally   Skin: No lesions noted.    Neuro:   Strength: Lower extremities have 5/5 strength  Muscle bulk: No atrophy  Sensation: Decreased over the right medial great toe to light touch  Reflexes: Areflexic lower extremities  SLR: Negative bilaterally      EMG/NCV  Sensory NCS      Nerve / Sites Distance Segments Peak Lat NP Amp    cm  ms µV   R SURAL - Antidr      Calf 14 Calf - Lat Mall 5.00 8.3      Ref.  Ref. 4.20 5.0   L SURAL - Antidr      Calf 14 Calf - Lat Mall 4.55 6.5      Ref.  Ref. 4.20 5.0       Motor NCS      Nerve / Sites Distance Segments Latency Amplitude Velocity Amp.1-2 Peak Dur. Area    cm  ms mV m/s % ms mVms   L COMM PERONEAL - EDB      Ankle 8 Ankle - EDB 4.25 2.5  100 6.45 8.5      Ref.  Ref. 6.00 2.0          Fib Head 31 Fib Head - Ankle 12.60 2.4 37.1 97.3 8.30 10.4      Ref.  Ref.   40.0      R COMM PERONEAL - EDB      Ankle 8 Ankle - EDB 4.95 2.9  100 5.70 9.0      Ref.  Ref. 6.00 2.0          Fib Head 30.5 Fib Head - Ankle 13.55 3.0 35.5 103 7.60 11.5      Ref.  Ref.   40.0          EMG Summary Table     Spontaneous MUAP Recruitment    IA Fib PSW Fasc H.F. Amp Dur. PPP Pattern   R. VAST LATERALIS N None None None None N N N N   R. TIB ANTERIOR N None None None None N N N N   R. PERON LONGUS N None None None None N N N N   R. GASTROCN (MED) N None None None None N 2+ N Reduced   R. D INTEROSS (LL) N 1+ 2+ None None N N N N   L. VAST LATERALIS N None None None None N N N N   L. TIB ANTERIOR N None None None None N N N N   L. PERON LONGUS N None None None None N N N N   L. GASTROCN (MED) N None None None None N N N N   L. D INTEROSS (LL) N None 2+ None None N N N N       Findings: Extremities were warmed with hot packs for 15 minutes prior to testing and skin temperature maintained above 32 C.  Sensory nerve conduction studies revealed bilaterally prolonged sural distal latencies.  Amplitudes were borderline small.  Motor nerve conduction studies revealed slowly conducting common peroneal motor responses bilaterally with  normal latencies and borderline small amplitudes.  Needle EMG revealed fibrillations of the foot dorsal interossei and reduced recruitment of the right medial gastrocnemius.  Other muscles tested were normal.  Impression:  1.  Abnormal study.  2.  Electrodiagnostic evidence is consistent with peripheral polyneuropathy.  This is characterized by subacute distal axon loss and demyelination.  3.  No electrodiagnostic evidence of lumbar radiculopathy bilaterally.      ASSESSMENT AND PLAN:  1. Idiopathic peripheral neuropathy  The patient has electrodiagnostic evidence and symptoms consistent with peripheral polyneuropathy.  The electrodiagnostic abnormalities are in a pattern most consistent with diabetic neuropathy.  Given the fact that his most recent hemoglobin A1c is normal, the etiology may be idiopathic.  If not already tested, consideration of B12 and folic acid levels may result in treatable forms of neuropathy.  - EMG        Thank you for the opportunity to participate in the care of this patient.  Sincerely,    Sunil Anna M.D.  Diplomate American Board of Physical Medicine and Rehabilitation

## 2025-03-28 ENCOUNTER — HOSPITAL ENCOUNTER (OUTPATIENT)
Dept: CT IMAGING | Facility: HOSPITAL | Age: 70
Discharge: HOME | End: 2025-03-28
Attending: INTERNAL MEDICINE
Payer: MEDICARE

## 2025-03-28 DIAGNOSIS — I65.21 CAROTID STENOSIS, RIGHT: ICD-10-CM

## 2025-03-28 DIAGNOSIS — I73.9 PERIPHERAL VASCULAR DISEASE, UNSPECIFIED: ICD-10-CM

## 2025-03-28 LAB
CREAT BLD-MCNC: 1 MG/DL
EGFRCR SERPLBLD CKD-EPI 2021: 82 ML/MIN/1.73M2 (ref 60–?)

## 2025-03-28 PROCEDURE — 70498 CT ANGIOGRAPHY NECK: CPT | Performed by: INTERNAL MEDICINE

## 2025-03-28 PROCEDURE — 82565 ASSAY OF CREATININE: CPT

## 2025-03-28 PROCEDURE — 70496 CT ANGIOGRAPHY HEAD: CPT | Performed by: INTERNAL MEDICINE

## 2025-05-12 RX ORDER — BUPRENORPHINE AND NALOXONE 8; 2 MG/1; MG/1
1 FILM, SOLUBLE BUCCAL; SUBLINGUAL DAILY
COMMUNITY

## 2025-05-12 NOTE — PAT NURSING NOTE
Pre-Surgical Instructions for 5/20/25 with Dr. Nina    -You are encouraged to check in electronically via [employer].      Pre-Surgical Testing:     -You are scheduled for non-fasting blood work and an EKG on Friday, 5/16 at 09:00 am at the [facility].     -Park in the [facility].      Adult Patients:     -Visiting hours are 07:00 am to 8:00 pm.       Pre-Op Instructions    Scheduled Surgery: You are scheduled for Vascular Surgery      Date of Procedure: Tuesday, 05/20/25      TENTATIVE time of arrival: 06:30 am     -This is going to be a tentative time of arrival for surgery.   -We will call you the buisness day prior to your surgery in the late afternoon to reconfirm your arrival.   -Please check your voicemail for a message.    [facility].    -Check in at the [facility] reception desk in the [facility].  -Our  will be there to check you in for your procedure.   -Complimentary [employer] parking is available starting at 6:00 am.      Diet Instructions:     -Do not eat or drink after 10:00 pm. This includes water, gum, candy and food.      Medication Instructions:     CONTINUE to take all prescribed medications as directed EXCEPT:    -Call me Friday, 5/16 between 8-5 to verify Nico' medications. I will send [employer] instructions now based on what medications I have in the system and will give you additional instructions when we talk Friday.    -The morning of surgery ONLY TAKE your Asprin, and Suboxone with a sip of water.       Medications to Stop:     -The last dose of the Testosterone injection will be Monday, 5/12.    -The last dose of Losartan-Hydrochlorothiazide will be Monday, 5/19.    -The last dose of vitamins, supplements, and NSAID's (ex. Ibuprofen, Excederin, Aleve, Diclofenac, and any gels having steroids) will be Monday, 5/12.       Skin Prep:     -Shower with Dial Soap the morning of your surgery (or any antibacterial  soap).       Admit/Discharge Status:     -You will be admitted to the hospital after surgery.    -We recommend you bring an overnight bag that you leave in the car the day of your surgery.    -We do not have lockers to lock up belongings so your  would have to hold onto bag while you are in the procedure.      Additional Instructions:     -Keep personal possessions to a minimum: bring insurance card(s) and picture ID,toiletries, wear comfortable clothing, do not wear contacts-bring glasses/eye glass case/denture supplies/container, cell phone .    -Leave all valuables at home, including jewelry.      Discharge Instructions:     -Your nurse will give you specific instructions before discharge.    -Any questions, please call the surgeon's office.      If you are scheduled to arrive early for 5:30 am, the [facility] reception desk does not open till 5:30 am. It may be dark, but you are in the correct location.     We are open Monday-Friday from 8:00 am-5:00 pm. We are closed on holidays and weekends. We can be reached at [phone].       Thank you

## 2025-05-15 ENCOUNTER — EKG ENCOUNTER (OUTPATIENT)
Dept: LAB | Facility: HOSPITAL | Age: 70
End: 2025-05-15
Attending: SURGERY
Payer: MEDICARE

## 2025-05-15 ENCOUNTER — LAB ENCOUNTER (OUTPATIENT)
Dept: LAB | Facility: HOSPITAL | Age: 70
End: 2025-05-15
Attending: SURGERY
Payer: MEDICARE

## 2025-05-15 DIAGNOSIS — I65.21 CAROTID STENOSIS, RIGHT: ICD-10-CM

## 2025-05-15 DIAGNOSIS — Z01.818 PRE-OP TESTING: ICD-10-CM

## 2025-05-15 LAB
ALBUMIN SERPL-MCNC: 4.5 G/DL (ref 3.2–4.8)
ALBUMIN/GLOB SERPL: 2 {RATIO} (ref 1–2)
ALP LIVER SERPL-CCNC: 48 U/L (ref 45–117)
ALT SERPL-CCNC: 11 U/L (ref 10–49)
ANION GAP SERPL CALC-SCNC: 4 MMOL/L (ref 0–18)
ANTIBODY SCREEN: NEGATIVE
APTT PPP: 24.9 SECONDS (ref 23–36)
AST SERPL-CCNC: 20 U/L (ref ?–34)
BASOPHILS # BLD AUTO: 0.06 X10(3) UL (ref 0–0.2)
BASOPHILS NFR BLD AUTO: 0.8 %
BILIRUB SERPL-MCNC: 0.8 MG/DL (ref 0.2–1.1)
BUN BLD-MCNC: 9 MG/DL (ref 9–23)
CALCIUM BLD-MCNC: 9.7 MG/DL (ref 8.7–10.6)
CHLORIDE SERPL-SCNC: 106 MMOL/L (ref 98–112)
CO2 SERPL-SCNC: 30 MMOL/L (ref 21–32)
CREAT BLD-MCNC: 1.01 MG/DL (ref 0.7–1.3)
EGFRCR SERPLBLD CKD-EPI 2021: 81 ML/MIN/1.73M2 (ref 60–?)
EOSINOPHIL # BLD AUTO: 0.23 X10(3) UL (ref 0–0.7)
EOSINOPHIL NFR BLD AUTO: 2.9 %
ERYTHROCYTE [DISTWIDTH] IN BLOOD BY AUTOMATED COUNT: 15 %
FASTING STATUS PATIENT QL REPORTED: NO
GLOBULIN PLAS-MCNC: 2.2 G/DL (ref 2–3.5)
GLUCOSE BLD-MCNC: 155 MG/DL (ref 70–99)
HCT VFR BLD AUTO: 48.6 % (ref 39–53)
HGB BLD-MCNC: 16.5 G/DL (ref 13–17.5)
IMM GRANULOCYTES # BLD AUTO: 0.02 X10(3) UL (ref 0–1)
IMM GRANULOCYTES NFR BLD: 0.3 %
INR BLD: 1.02 (ref 0.8–1.2)
LYMPHOCYTES # BLD AUTO: 2.4 X10(3) UL (ref 1–4)
LYMPHOCYTES NFR BLD AUTO: 30.2 %
MCH RBC QN AUTO: 31.7 PG (ref 26–34)
MCHC RBC AUTO-ENTMCNC: 34 G/DL (ref 31–37)
MCV RBC AUTO: 93.5 FL (ref 80–100)
MONOCYTES # BLD AUTO: 0.51 X10(3) UL (ref 0.1–1)
MONOCYTES NFR BLD AUTO: 6.4 %
NEUTROPHILS # BLD AUTO: 4.73 X10 (3) UL (ref 1.5–7.7)
NEUTROPHILS # BLD AUTO: 4.73 X10(3) UL (ref 1.5–7.7)
NEUTROPHILS NFR BLD AUTO: 59.4 %
OSMOLALITY SERPL CALC.SUM OF ELEC: 292 MOSM/KG (ref 275–295)
PLATELET # BLD AUTO: 222 10(3)UL (ref 150–450)
POTASSIUM SERPL-SCNC: 4.2 MMOL/L (ref 3.5–5.1)
PROT SERPL-MCNC: 6.7 G/DL (ref 5.7–8.2)
PROTHROMBIN TIME: 13.5 SECONDS (ref 11.6–14.8)
RBC # BLD AUTO: 5.2 X10(6)UL (ref 3.8–5.8)
RH BLOOD TYPE: NEGATIVE
SODIUM SERPL-SCNC: 140 MMOL/L (ref 136–145)
WBC # BLD AUTO: 8 X10(3) UL (ref 4–11)

## 2025-05-15 PROCEDURE — 93005 ELECTROCARDIOGRAM TRACING: CPT

## 2025-05-15 PROCEDURE — 85730 THROMBOPLASTIN TIME PARTIAL: CPT

## 2025-05-15 PROCEDURE — 36415 COLL VENOUS BLD VENIPUNCTURE: CPT

## 2025-05-15 PROCEDURE — 86901 BLOOD TYPING SEROLOGIC RH(D): CPT

## 2025-05-15 PROCEDURE — 86900 BLOOD TYPING SEROLOGIC ABO: CPT

## 2025-05-15 PROCEDURE — 86850 RBC ANTIBODY SCREEN: CPT

## 2025-05-15 PROCEDURE — 85025 COMPLETE CBC W/AUTO DIFF WBC: CPT

## 2025-05-15 PROCEDURE — 80053 COMPREHEN METABOLIC PANEL: CPT

## 2025-05-15 PROCEDURE — 93010 ELECTROCARDIOGRAM REPORT: CPT | Performed by: INTERNAL MEDICINE

## 2025-05-15 PROCEDURE — 85610 PROTHROMBIN TIME: CPT

## 2025-05-16 LAB
ATRIAL RATE: 58 BPM
P AXIS: -5 DEGREES
P-R INTERVAL: 156 MS
Q-T INTERVAL: 394 MS
QRS DURATION: 84 MS
QTC CALCULATION (BEZET): 386 MS
R AXIS: 25 DEGREES
T AXIS: 57 DEGREES
VENTRICULAR RATE: 58 BPM

## 2025-05-16 RX ORDER — ANASTROZOLE 1 MG/1
0.5 TABLET ORAL
COMMUNITY

## 2025-05-21 NOTE — H&P
[facility]    PATIENT'S NAME: HOANG GARCIA   ATTENDING PHYSICIAN: Daniel Nina M.D.   PATIENT ACCOUNT#:   [id number]    LOCATION:    MEDICAL RECORD #:   [id number]       YOB: 1955  ADMISSION DATE:       05/22/2025    HISTORY AND PHYSICAL EXAMINATION    HISTORY OF PRESENT ILLNESS:  A 70-year-old white male referred for high-grade right carotid stenosis.  He had been seen and evaluated by Dr. Kelin Downs from [facility], found to have a significant stenosis.  He has no CVA, TIA, visual field defect, or aphasia.  He does have a headache.  It occurred about 9 months ago.  At that time, he had no TIA symptoms.  He had no visual loss, no aphasia.  The patient sometimes has a problem where he gets ataxia and some vertigo.    PAST MEDICAL HISTORY:  He has a history of hypertension and dyslipidemia.    MEDICATIONS:  He is on Crestor 5 mg a day, metoprolol succinate, Imitrex for pain medication, Topamax, losartan-hydrochlorothiazide.    ALLERGIES:  He is allergic to penicillin.    FAMILY HISTORY:  Family history of coronary artery disease and has a family history of a sister having a stroke.    SOCIAL HISTORY:  He has a long history of smoking and still smoking.  He has an opioid addiction due to chronic back pain.  He finally got off that.  He is  with children.  He works as a .    REVIEW OF SYSTEMS:  He has no gangrene, tissue loss, ulceration, or rest pain in lower legs.  He has no hematuria, dysuria, hemoptysis, cough, sputum production.  No weight loss, fever, chills.  No hematemesis or bright red blood per rectum.      PHYSICAL EXAMINATION:    GENERAL:  He is awake, alert.  He is appropriate, in no acute distress.  VITAL SIGNS:  Blood pressure 126/70, heart rate 72, respirations 20.  HEENT:  Pupils equal, round.  Sclerae clear.  Mouth without lesions.  NECK:  No JVD.  He has a right cervical bruit.  LUNGS:  Have breath sounds bilaterally.  HEART:  Normal.  No murmur.  ABDOMEN:   Soft.  No tenderness.  No masses.  EXTREMITIES:  Femoral, popliteal, and pedal pulses all palpable.  Upper extremity pulses are palpable.  No gangrene or tissue loss in the lower legs.  No swelling or tenderness of the joints.  No palpable lymph nodes in the groins.  No clubbing of the fingertips.  NEUROLOGIC:  Moving all 4 extremities.  Oriented x3.    Duplex ultrasound showed a peak velocity of 262 cm/sec from [facility].  Both vertebral arteries showed antegrade flow.  His left side was not significantly narrowed.  Ratio of 3.4 on the right side.    CT angiogram shows 75% ulcerated calcified plaque, somewhat high in the bifurcation.    IMPRESSION:  Patient with a possible symptomatic right carotid stenosis, with history of hypertension and dyslipidemia.  I have recommended a right carotid endarterectomy and vein patch.  Risks and complications including death, myocardial infarction, bleeding, infection, stroke, cranial nerve injury, renal failure, multisystem organ failure, sepsis, cardiac arrest, cranial nerve injury were explained.  Risks and complications of not doing the procedure were explained.  Risks and complications of stenting were explained, either femoral or TCAR, does not appear to be a good candidate for that.  All questions answered for the patient.    Dictated By Daniel Nina M.D.  d: 05/21/2025 10:19:09  t: 05/21/2025 11:59:24  Job [id number]  JJW/    cc: Daniel Nina M.D.

## 2025-05-22 ENCOUNTER — HOSPITAL ENCOUNTER (INPATIENT)
Facility: HOSPITAL | Age: 70
LOS: 3 days | Discharge: HOME | End: 2025-05-25
Attending: SURGERY | Admitting: SURGERY
Payer: MEDICARE

## 2025-05-22 ENCOUNTER — ANESTHESIA (OUTPATIENT)
Dept: CARDIAC SURGERY | Facility: HOSPITAL | Age: 70
End: 2025-05-22
Payer: MEDICARE

## 2025-05-22 ENCOUNTER — ANESTHESIA EVENT (OUTPATIENT)
Dept: CARDIAC SURGERY | Facility: HOSPITAL | Age: 70
End: 2025-05-22
Payer: MEDICARE

## 2025-05-22 DIAGNOSIS — Z01.818 PRE-OP TESTING: ICD-10-CM

## 2025-05-22 DIAGNOSIS — I65.21 CAROTID STENOSIS, RIGHT: Primary | ICD-10-CM

## 2025-05-22 LAB
ANION GAP SERPL CALC-SCNC: 5 MMOL/L (ref 0–18)
APTT PPP: 25.2 SECONDS (ref 23–36)
BASE EXCESS BLDA CALC-SCNC: -5.1 MMOL/L (ref ?–2)
BODY TEMPERATURE: 98.6 F
BUN BLD-MCNC: 12 MG/DL (ref 9–23)
CALCIUM BLD-MCNC: 8.3 MG/DL (ref 8.7–10.6)
CHLORIDE SERPL-SCNC: 114 MMOL/L (ref 98–112)
CO2 SERPL-SCNC: 24 MMOL/L (ref 21–32)
COHGB MFR BLD: 1.5 % SAT (ref 0–3)
CREAT BLD-MCNC: 1.01 MG/DL (ref 0.7–1.3)
EGFRCR SERPLBLD CKD-EPI 2021: 81 ML/MIN/1.73M2 (ref 60–?)
ERYTHROCYTE [DISTWIDTH] IN BLOOD BY AUTOMATED COUNT: 15.1 %
GLUCOSE BLD-MCNC: 174 MG/DL (ref 70–99)
HCO3 BLDA-SCNC: 20.9 MEQ/L (ref 21–27)
HCT VFR BLD AUTO: 41.6 % (ref 39–53)
HGB BLD-MCNC: 12.8 G/DL (ref 13–17.5)
HGB BLD-MCNC: 14.2 G/DL (ref 13–17.5)
INR BLD: 1.08 (ref 0.8–1.2)
ISTAT ACTIVATED CLOTTING TIME: 130 SECONDS (ref 74–137)
ISTAT ACTIVATED CLOTTING TIME: 268 SECONDS (ref 74–137)
ISTAT ACTIVATED CLOTTING TIME: 291 SECONDS (ref 74–137)
L/M: 5 L/MIN
MCH RBC QN AUTO: 32.3 PG (ref 26–34)
MCHC RBC AUTO-ENTMCNC: 34.1 G/DL (ref 31–37)
MCV RBC AUTO: 94.5 FL (ref 80–100)
METHGB MFR BLD: 0.7 % SAT (ref 0.4–1.5)
MRSA DNA SPEC QL NAA+PROBE: NEGATIVE
OSMOLALITY SERPL CALC.SUM OF ELEC: 300 MOSM/KG (ref 275–295)
OXYHGB MFR BLDA: 96.7 % (ref 92–100)
PCO2 BLDA: 43 MM HG (ref 35–45)
PH BLDA: 7.3 [PH] (ref 7.35–7.45)
PLATELET # BLD AUTO: 229 10(3)UL (ref 150–450)
PO2 BLDA: 116 MM HG (ref 80–100)
POTASSIUM SERPL-SCNC: 4.3 MMOL/L (ref 3.5–5.1)
PROTHROMBIN TIME: 14.2 SECONDS (ref 11.6–14.8)
RBC # BLD AUTO: 4.4 X10(6)UL (ref 3.8–5.8)
RH BLOOD TYPE: NEGATIVE
SODIUM SERPL-SCNC: 143 MMOL/L (ref 136–145)
TROPONIN I SERPL HS-MCNC: 4 NG/L (ref ?–53)
WBC # BLD AUTO: 14.2 X10(3) UL (ref 4–11)

## 2025-05-22 PROCEDURE — 03UM37Z SUPPLEMENT RIGHT EXTERNAL CAROTID ARTERY WITH AUTOLOGOUS TISSUE SUBSTITUTE, PERCUTANEOUS APPROACH: ICD-10-PCS | Performed by: SURGERY

## 2025-05-22 PROCEDURE — 03CM0ZZ EXTIRPATION OF MATTER FROM RIGHT EXTERNAL CAROTID ARTERY, OPEN APPROACH: ICD-10-PCS | Performed by: SURGERY

## 2025-05-22 PROCEDURE — 99223 1ST HOSP IP/OBS HIGH 75: CPT | Performed by: INTERNAL MEDICINE

## 2025-05-22 PROCEDURE — 06BP4ZZ EXCISION OF RIGHT SAPHENOUS VEIN, PERCUTANEOUS ENDOSCOPIC APPROACH: ICD-10-PCS | Performed by: SURGERY

## 2025-05-22 RX ORDER — ONDANSETRON 2 MG/ML
4 INJECTION INTRAMUSCULAR; INTRAVENOUS EVERY 6 HOURS PRN
Status: DISCONTINUED | OUTPATIENT
Start: 2025-05-22 | End: 2025-05-25

## 2025-05-22 RX ORDER — PROTAMINE SULFATE 10 MG/ML
INJECTION, SOLUTION INTRAVENOUS AS NEEDED
Status: DISCONTINUED | OUTPATIENT
Start: 2025-05-22 | End: 2025-05-22 | Stop reason: SURG

## 2025-05-22 RX ORDER — DEXAMETHASONE SODIUM PHOSPHATE 4 MG/ML
4 VIAL (ML) INJECTION AS NEEDED
Status: DISCONTINUED | OUTPATIENT
Start: 2025-05-22 | End: 2025-05-22

## 2025-05-22 RX ORDER — HEPARIN SODIUM 1000 [USP'U]/ML
INJECTION, SOLUTION INTRAVENOUS; SUBCUTANEOUS AS NEEDED
Status: DISCONTINUED | OUTPATIENT
Start: 2025-05-22 | End: 2025-05-22 | Stop reason: SURG

## 2025-05-22 RX ORDER — PHENYLEPHRINE HCL IN 0.9% NACL 50MG/250ML
PLASTIC BAG, INJECTION (ML) INTRAVENOUS CONTINUOUS PRN
Status: DISCONTINUED | OUTPATIENT
Start: 2025-05-22 | End: 2025-05-25

## 2025-05-22 RX ORDER — POLYETHYLENE GLYCOL 3350 17 G/17G
17 POWDER, FOR SOLUTION ORAL DAILY PRN
Status: DISCONTINUED | OUTPATIENT
Start: 2025-05-22 | End: 2025-05-25

## 2025-05-22 RX ORDER — LIDOCAINE HYDROCHLORIDE 10 MG/ML
INJECTION, SOLUTION INFILTRATION; PERINEURAL AS NEEDED
Status: DISCONTINUED | OUTPATIENT
Start: 2025-05-22 | End: 2025-05-22 | Stop reason: HOSPADM

## 2025-05-22 RX ORDER — SODIUM CHLORIDE 9 MG/ML
INJECTION, SOLUTION INTRAVENOUS CONTINUOUS PRN
Status: DISCONTINUED | OUTPATIENT
Start: 2025-05-22 | End: 2025-05-22 | Stop reason: SURG

## 2025-05-22 RX ORDER — SODIUM CHLORIDE, SODIUM LACTATE, POTASSIUM CHLORIDE, CALCIUM CHLORIDE 600; 310; 30; 20 MG/100ML; MG/100ML; MG/100ML; MG/100ML
INJECTION, SOLUTION INTRAVENOUS CONTINUOUS
Status: DISCONTINUED | OUTPATIENT
Start: 2025-05-22 | End: 2025-05-23

## 2025-05-22 RX ORDER — HEPARIN SODIUM 5000 [USP'U]/ML
5000 INJECTION, SOLUTION INTRAVENOUS; SUBCUTANEOUS ONCE
Status: DISCONTINUED | OUTPATIENT
Start: 2025-05-22 | End: 2025-05-22 | Stop reason: HOSPADM

## 2025-05-22 RX ORDER — METOCLOPRAMIDE HYDROCHLORIDE 5 MG/ML
10 INJECTION INTRAMUSCULAR; INTRAVENOUS EVERY 8 HOURS PRN
Status: DISCONTINUED | OUTPATIENT
Start: 2025-05-22 | End: 2025-05-25

## 2025-05-22 RX ORDER — SENNOSIDES 8.6 MG
17.2 TABLET ORAL NIGHTLY PRN
Status: DISCONTINUED | OUTPATIENT
Start: 2025-05-22 | End: 2025-05-25

## 2025-05-22 RX ORDER — NITROGLYCERIN 20 MG/100ML
INJECTION INTRAVENOUS CONTINUOUS PRN
Status: DISCONTINUED | OUTPATIENT
Start: 2025-05-22 | End: 2025-05-22 | Stop reason: SURG

## 2025-05-22 RX ORDER — BISACODYL 10 MG
10 SUPPOSITORY, RECTAL RECTAL
Status: DISCONTINUED | OUTPATIENT
Start: 2025-05-22 | End: 2025-05-25

## 2025-05-22 RX ORDER — ESMOLOL HYDROCHLORIDE 10 MG/ML
INJECTION INTRAVENOUS AS NEEDED
Status: DISCONTINUED | OUTPATIENT
Start: 2025-05-22 | End: 2025-05-22 | Stop reason: SURG

## 2025-05-22 RX ORDER — DEXAMETHASONE SODIUM PHOSPHATE 4 MG/ML
VIAL (ML) INJECTION AS NEEDED
Status: DISCONTINUED | OUTPATIENT
Start: 2025-05-22 | End: 2025-05-22 | Stop reason: SURG

## 2025-05-22 RX ORDER — EPHEDRINE SULFATE 50 MG/ML
INJECTION INTRAVENOUS AS NEEDED
Status: DISCONTINUED | OUTPATIENT
Start: 2025-05-22 | End: 2025-05-22 | Stop reason: SURG

## 2025-05-22 RX ORDER — HYDROMORPHONE HYDROCHLORIDE 1 MG/ML
0.4 INJECTION, SOLUTION INTRAMUSCULAR; INTRAVENOUS; SUBCUTANEOUS EVERY 5 MIN PRN
Status: DISCONTINUED | OUTPATIENT
Start: 2025-05-22 | End: 2025-05-22

## 2025-05-22 RX ORDER — ACETAMINOPHEN 325 MG/1
650 TABLET ORAL EVERY 6 HOURS
Status: COMPLETED | OUTPATIENT
Start: 2025-05-22 | End: 2025-05-23

## 2025-05-22 RX ORDER — ONDANSETRON 2 MG/ML
4 INJECTION INTRAMUSCULAR; INTRAVENOUS AS NEEDED
Status: DISCONTINUED | OUTPATIENT
Start: 2025-05-22 | End: 2025-05-22

## 2025-05-22 RX ORDER — HYDROCODONE BITARTRATE AND ACETAMINOPHEN 5; 325 MG/1; MG/1
2 TABLET ORAL ONCE AS NEEDED
Status: DISCONTINUED | OUTPATIENT
Start: 2025-05-22 | End: 2025-05-22

## 2025-05-22 RX ORDER — NALOXONE HYDROCHLORIDE 0.4 MG/ML
0.08 INJECTION, SOLUTION INTRAMUSCULAR; INTRAVENOUS; SUBCUTANEOUS AS NEEDED
Status: DISCONTINUED | OUTPATIENT
Start: 2025-05-22 | End: 2025-05-22

## 2025-05-22 RX ORDER — HYDROCODONE BITARTRATE AND ACETAMINOPHEN 5; 325 MG/1; MG/1
1 TABLET ORAL ONCE AS NEEDED
Status: DISCONTINUED | OUTPATIENT
Start: 2025-05-22 | End: 2025-05-22

## 2025-05-22 RX ORDER — METOCLOPRAMIDE HYDROCHLORIDE 5 MG/ML
10 INJECTION INTRAMUSCULAR; INTRAVENOUS AS NEEDED
Status: DISCONTINUED | OUTPATIENT
Start: 2025-05-22 | End: 2025-05-22

## 2025-05-22 RX ORDER — SODIUM PHOSPHATE, DIBASIC AND SODIUM PHOSPHATE, MONOBASIC 7; 19 G/230ML; G/230ML
1 ENEMA RECTAL ONCE AS NEEDED
Status: DISCONTINUED | OUTPATIENT
Start: 2025-05-22 | End: 2025-05-25

## 2025-05-22 RX ORDER — BUPIVACAINE HYDROCHLORIDE 5 MG/ML
INJECTION, SOLUTION EPIDURAL; INTRACAUDAL; PERINEURAL AS NEEDED
Status: DISCONTINUED | OUTPATIENT
Start: 2025-05-22 | End: 2025-05-22 | Stop reason: HOSPADM

## 2025-05-22 RX ORDER — PHENYLEPHRINE HCL 10 MG/ML
VIAL (ML) INJECTION AS NEEDED
Status: DISCONTINUED | OUTPATIENT
Start: 2025-05-22 | End: 2025-05-22 | Stop reason: SURG

## 2025-05-22 RX ORDER — ROCURONIUM BROMIDE 10 MG/ML
INJECTION, SOLUTION INTRAVENOUS AS NEEDED
Status: DISCONTINUED | OUTPATIENT
Start: 2025-05-22 | End: 2025-05-22 | Stop reason: SURG

## 2025-05-22 RX ORDER — LIDOCAINE HYDROCHLORIDE 10 MG/ML
INJECTION, SOLUTION EPIDURAL; INFILTRATION; INTRACAUDAL; PERINEURAL AS NEEDED
Status: DISCONTINUED | OUTPATIENT
Start: 2025-05-22 | End: 2025-05-22 | Stop reason: SURG

## 2025-05-22 RX ORDER — ASPIRIN 81 MG/1
81 TABLET ORAL DAILY
Status: DISCONTINUED | OUTPATIENT
Start: 2025-05-22 | End: 2025-05-25

## 2025-05-22 RX ORDER — ONDANSETRON 2 MG/ML
INJECTION INTRAMUSCULAR; INTRAVENOUS AS NEEDED
Status: DISCONTINUED | OUTPATIENT
Start: 2025-05-22 | End: 2025-05-22 | Stop reason: SURG

## 2025-05-22 RX ORDER — NITROGLYCERIN 20 MG/100ML
INJECTION INTRAVENOUS
Status: DISCONTINUED | OUTPATIENT
Start: 2025-05-22 | End: 2025-05-23

## 2025-05-22 RX ORDER — TRAMADOL HYDROCHLORIDE 50 MG/1
50 TABLET ORAL EVERY 6 HOURS PRN
Status: DISCONTINUED | OUTPATIENT
Start: 2025-05-22 | End: 2025-05-25

## 2025-05-22 RX ORDER — CLOPIDOGREL BISULFATE 75 MG/1
75 TABLET ORAL DAILY
Status: DISCONTINUED | OUTPATIENT
Start: 2025-05-22 | End: 2025-05-25

## 2025-05-22 RX ORDER — ACETAMINOPHEN 10 MG/ML
1000 INJECTION, SOLUTION INTRAVENOUS ONCE
Status: COMPLETED | OUTPATIENT
Start: 2025-05-22 | End: 2025-05-22

## 2025-05-22 RX ADMIN — LIDOCAINE HYDROCHLORIDE 50 MG: 10 INJECTION, SOLUTION EPIDURAL; INFILTRATION; INTRACAUDAL; PERINEURAL at 09:52:00

## 2025-05-22 RX ADMIN — HEPARIN SODIUM 12000 UNITS: 1000 INJECTION, SOLUTION INTRAVENOUS; SUBCUTANEOUS at 11:16:00

## 2025-05-22 RX ADMIN — ROCURONIUM BROMIDE 10 MG: 10 INJECTION, SOLUTION INTRAVENOUS at 12:17:00

## 2025-05-22 RX ADMIN — HEPARIN SODIUM 2000 UNITS: 1000 INJECTION, SOLUTION INTRAVENOUS; SUBCUTANEOUS at 11:30:00

## 2025-05-22 RX ADMIN — ESMOLOL HYDROCHLORIDE 20 MG: 10 INJECTION INTRAVENOUS at 13:20:00

## 2025-05-22 RX ADMIN — NITROGLYCERIN 10 MCG/MIN: 20 INJECTION INTRAVENOUS at 13:12:00

## 2025-05-22 RX ADMIN — PHENYLEPHRINE HCL 100 MCG: 10 MG/ML VIAL (ML) INJECTION at 09:57:00

## 2025-05-22 RX ADMIN — NITROGLYCERIN 5 MCG/MIN: 20 INJECTION INTRAVENOUS at 13:04:00

## 2025-05-22 RX ADMIN — SODIUM CHLORIDE: 9 INJECTION, SOLUTION INTRAVENOUS at 09:48:00

## 2025-05-22 RX ADMIN — EPHEDRINE SULFATE 5 MG: 50 INJECTION INTRAVENOUS at 11:29:00

## 2025-05-22 RX ADMIN — ONDANSETRON 4 MG: 2 INJECTION INTRAMUSCULAR; INTRAVENOUS at 13:02:00

## 2025-05-22 RX ADMIN — PROTAMINE SULFATE 30 MG: 10 INJECTION, SOLUTION INTRAVENOUS at 12:44:00

## 2025-05-22 RX ADMIN — DEXAMETHASONE SODIUM PHOSPHATE 4 MG: 4 MG/ML VIAL (ML) INJECTION at 10:06:00

## 2025-05-22 RX ADMIN — ROCURONIUM BROMIDE 10 MG: 10 INJECTION, SOLUTION INTRAVENOUS at 11:07:00

## 2025-05-22 RX ADMIN — EPHEDRINE SULFATE 10 MG: 50 INJECTION INTRAVENOUS at 11:19:00

## 2025-05-22 RX ADMIN — PHENYLEPHRINE HCL 100 MCG: 10 MG/ML VIAL (ML) INJECTION at 10:05:00

## 2025-05-22 RX ADMIN — ROCURONIUM BROMIDE 50 MG: 10 INJECTION, SOLUTION INTRAVENOUS at 09:52:00

## 2025-05-22 RX ADMIN — EPHEDRINE SULFATE 10 MG: 50 INJECTION INTRAVENOUS at 12:17:00

## 2025-05-22 RX ADMIN — PHENYLEPHRINE HCL 100 MCG: 10 MG/ML VIAL (ML) INJECTION at 10:00:00

## 2025-05-22 RX ADMIN — NITROGLYCERIN 30 MCG/MIN: 20 INJECTION INTRAVENOUS at 13:16:00

## 2025-05-22 RX ADMIN — ROCURONIUM BROMIDE 20 MG: 10 INJECTION, SOLUTION INTRAVENOUS at 11:43:00

## 2025-05-22 RX ADMIN — PHENYLEPHRINE HCL 100 MCG: 10 MG/ML VIAL (ML) INJECTION at 10:03:00

## 2025-05-22 RX ADMIN — ROCURONIUM BROMIDE 20 MG: 10 INJECTION, SOLUTION INTRAVENOUS at 10:40:00

## 2025-05-22 RX ADMIN — PHENYLEPHRINE HCL 100 MCG: 10 MG/ML VIAL (ML) INJECTION at 10:19:00

## 2025-05-22 NOTE — ANESTHESIA POSTPROCEDURE EVALUATION
[facility]    [facility] Patient Status:  Inpatient   Age/Gender 70 year old male MRN [id number]   Location [facility] 4SW-A Attending Daniel Nina MD   Hosp Day # 0 PCP Lasha Fong MD       Anesthesia Post-op Note    RIGHT CAROTID ENDARTERECTOMY WITH VEIN PATCH    Procedure Summary       Date: 05/22/25 Room / Location: [facility] 01 /  CVOR    Anesthesia Start: 0948 Anesthesia Stop:     Procedure: RIGHT CAROTID ENDARTERECTOMY WITH VEIN PATCH (Right) Diagnosis: (Right Carotid Stenosis)    Surgeons: Daniel Nina MD Anesthesiologist: Wilver Colindres MD    Anesthesia Type: general ASA Status: 3            Anesthesia Type: general    Vitals Value Taken Time   /63 05/22/25 13:39   Temp 97 05/22/25 13:39   Pulse 94 05/22/25 13:38   Resp 13 05/22/25 13:38   SpO2 98 % 05/22/25 13:38   Vitals shown include unfiled device data.        Patient Location: ICU    Anesthesia Type: general    Airway Patency: patent    Postop Pain Control: adequate    Mental Status: mildly sedated but able to meaningfully participate in the post-anesthesia evaluation    Nausea/Vomiting: none    Cardiopulmonary/Hydration status: stable euvolemic    Complications: no apparent anesthesia related complications    Postop vital signs: stable    Dental Exam: Unchanged from Preop    Sign out given to ICU staff.

## 2025-05-22 NOTE — PLAN OF CARE
Received pt from the OR. Nitroglycerin infusing, see mar for details. Groin site soft, warm, intact, small bruise. R neck site soft, warm, intact.  Pt unwilling to participate in assessment at times. Bladder scan per protocol, pt symptomatic, straight cathed. Chest pain, EKG done. JESIKA/MD at bedside. Trop sent. See mar and flowsheet for more details.

## 2025-05-22 NOTE — ANESTHESIA PREPROCEDURE EVALUATION
PRE-OP EVALUATION    Patient Name: Nico Harmon    Admit Diagnosis: Right Carotid Stenosis    Pre-op Diagnosis: Right Carotid Stenosis    RIGHT CAROTID ENDARTERECTOMY WITH VEIN PATCH    Anesthesia Procedure: RIGHT CAROTID ENDARTERECTOMY WITH VEIN PATCH (Right)    Surgeons and Role:     * Daniel Nina MD - Primary    Pre-op vitals reviewed.        Body mass index is 25.82 kg/m².    Current medications reviewed.  Hospital Medications:  Current Medications[1]    Outpatient Medications:   Prescriptions Prior to Admission[2]    Allergies: Penicillins      Anesthesia Evaluation    Patient summary reviewed.    Anesthetic Complications  (-) history of anesthetic complications         GI/Hepatic/Renal                (+) liver disease  (+) hepatitis               Cardiovascular  Comment: PVD                (+) hypertension   (+) hyperlipidemia  (+) CAD                                Endo/Other                                  Pulmonary  Comment: smoker             (+) shortness of breath            Neuro/Psych                                      Past Surgical History[3]  Social Hx on file[4]  History   Drug Use No     Available pre-op labs reviewed.  Lab Results   Component Value Date    WBC 8.0 05/15/2025    RBC 5.20 05/15/2025    HGB 16.5 05/15/2025    HCT 48.6 05/15/2025    MCV 93.5 05/15/2025    MCH 31.7 05/15/2025    MCHC 34.0 05/15/2025    RDW 15.0 05/15/2025    .0 05/15/2025     Lab Results   Component Value Date     05/15/2025    K 4.2 05/15/2025     05/15/2025    CO2 30.0 05/15/2025    BUN 9 05/15/2025    CREATSERUM 1.01 05/15/2025     (H) 05/15/2025    CA 9.7 05/15/2025     Lab Results   Component Value Date    INR 1.02 05/15/2025         Airway      Mallampati: II  Mouth opening: 3 FB  TM distance: 4 - 6 cm  Neck ROM: full Cardiovascular      Rhythm: regular  Rate: normal     Dental  Comment: No loose teeth reported by patient           Pulmonary      Breath sounds clear to  auscultation bilaterally.               Other findings              ASA: 3   Plan: general  NPO status verified and patient meets guidelines.        Comment: Discussed general anesthesia with endotracheal tube/supraglottic airway with patient. Discussed risk of oral/dental trauma, nausea, rare allergic reactions. Patient understands the risks, benefits, and requirement for anesthesia and willing to proceed. Consent signed.       Plan/risks discussed with: patient                Present on Admission:  **None**             [1]   No current facility-administered medications on file as of .   [2]   No medications prior to admission.   [3]   Past Surgical History:  Procedure Laterality Date    Angioplasty (coronary) Right 2017    Right calf    Leg/ankle surgery proc unlisted Right 1990's    Attached ligaments with anchor   [4]   Social History  Socioeconomic History    Marital status: Life Partner   Tobacco Use    Smoking status: Every Day     Current packs/day: 1.00     Average packs/day: 1 pack/day for 42.0 years (42.0 ttl pk-yrs)     Types: Cigarettes    Smokeless tobacco: Never   Vaping Use    Vaping status: Never Used   Substance and Sexual Activity    Alcohol use: Yes     Comment: socially    Drug use: No    Sexual activity: Yes

## 2025-05-22 NOTE — OPERATIVE REPORT
[facility]    PATIENT'S NAME: HOANG GARCIA   ATTENDING PHYSICIAN: Daniel Nina M.D.   OPERATING PHYSICIAN: Daniel Nina M.D.   PATIENT ACCOUNT#:   [id number]    LOCATION:  [address]  MEDICAL RECORD #:   [id number]       YOB: 1955  ADMISSION DATE:       05/22/2025      OPERATION DATE:  05/22/2025    OPERATIVE REPORT    PREOPERATIVE DIAGNOSIS:  Severe right carotid artery stenosis with TIA, with at least over 70% to 80% blockage.  POSTOPERATIVE DIAGNOSIS:  Severe right carotid artery stenosis with TIA, with at least an 80% to 90% blockage with very poor backbleeding from the right internal carotid artery.  PROCEDURE:  Right carotid endarterectomy with right great saphenous vein patch and use of a Sundt shunt.    ASSISTANT:  GRACIELA Batista.    INDICATIONS:  This is a 70-year-old white male seen and evaluated by Dr. Downs, cleared for surgery, who had symptoms of ataxia, dizziness/vertigo and had a right carotid stenosis that was at least 70% to 80% blockage.  He was recommended for a right carotid endarterectomy and vein patch with the risks and complications including death, stroke, myocardial infarction, bleeding, infection, cranial nerve injury, renal failure, cardiac arrest, wound infection, and multisystem organ failure.  Risks and  complications of balloon angioplasty and stent from either a TCAR or femoral approach were discussed, but the significant calcified plaque precluded this.  The risks and complications of just medical management alone were also discussed.  The patient agreed for the procedure.  All questions were answered.    OPERATIVE TECHNIQUE:  The patient was placed supine on the procedure table, underwent general anesthesia, received preoperative antibiotics.  No Santiago was placed.  The patient had art line and IV started by Anesthesia.  His ultrasound showed a good saphenous vein in his right leg that appeared to be adequate, and it was noted that he had a  relatively high bifurcation.  The right neck, jaw, upper chest as well as the right thigh were prepped and draped in usual sterile technique.  Ioban Vi-Drape was used to cover the operative field.  The patient also had SCDs on both lower legs and had received preoperative antibiotics.    After time-out was done, incision was made in the right thigh, dissection brought down to expose the saphenous vein.  It appeared to be good quality.  It was ligated proximally and distally with 3-0 Ethibond on a needle.  Branches were ligated with 4-0 Ethibond ties and hemoclips and divided.  The vein was harvested, eventually trimmed, and prepared for vein patch.  Hemostasis was obtained in the thigh wound, and it was closed in multiple layers with 2-0 Vicryl, then 3-0 Vicryl subcuticular, anesthetized with 20 mL of 0.25% Marcaine plain.    Incision was made curvilinear in the right neck and posterolateral to the jaw.  Dissection was brought down through the subcutaneous tissue through the platysma.  The sternocleidomastoid muscle was mobilized laterally.  The patient had a lot of inflammatory reaction with lymph nodes noted, and some were sent for pathology.  The patient had the jugular vein dissected out, and thyroidal veins were ligated with 6-0 Prolene sutures, hemoclips, and divided.  The common carotid artery was identified deep in the neck.  The vagus nerve was identified posterolateral.    Dissection was brought up along the internal jugular vein.  The facial vein was identified.  It was ligated with 4-0 and 6-0 Prolene sutures and hemoclips.  The patient had the internal carotid artery dissected all the way up towards the digastric muscle.  The bifurcation ended up going into the area of the digastric muscle.  The bifurcation was short of this.  It was dissected out with care being taken to prevent any embolization of air or debris, and no manipulation was done of the cranial nerves.  Internal carotid artery was  identified beyond all disease.  External and superior thyroidal arteries were also isolated.    A Rhodes retractor was used for exposure.  The patient received a bolus of heparin.  The blood pressure was raised up to 150 to 170 systolic.    After greater than a 5 minute period of time with the blood pressure elevated, a Paulino clamp was placed upon the internal carotid artery that was normal distal to all disease, vessel loops and angled PV clamp put upon the common carotid artery, and vessel loops were used to control the external and superior thyroidal artery, and the carotid bifurcation sharply mobilized upward out of the wound.  Arteriotomy was performed on the common carotid artery proximally and then extended first proximally and then distally through a severely ulcerated, calcified, near-occlusive lesion of the carotid bifurcation into normal-appearing intima of the internal carotid artery but maybe 2 cm beyond this area.    The patient's backbleeding was very poor, and the saturations dropped down with cerebral oximetry from about 77 down to 62.  A heparinized-filled Sundt shunt was placed distally with backbleeding and then proximally with care being taken to prevent any embolization of air or debris.  Once this was done, the cerebral oxygenation returned back to near normal.  Total clamp time prior to placement of the shunt was approximately 1-1/2 minutes.    With the shunt in place, the endarterectomy was performed.  The plaque was heavily calcified into the wall of the artery.  It was transected proximally, then tapered off on the internal carotid artery distally without any difficulty, and then, with eversion, endarterectomy of the external carotid artery was done with good backbleeding.  Tacking was done both proximally and distally to make sure everything was tacked down.  All loose debris was removed with fine pickups and heparinized saline solution.    The vein patch was then performed with 6-0  Prolene suture with multiple 6-0 Prolene sutures used.  Prior to completion of the patch, the shunt was removed, first distally with backbleeding and reapplication of Paulino clamp, and then proximally with good forward bleeding and application of an angled PV clamp.  Backbleeding was allowed from the external carotid artery.  The area was irrigated copiously with heparinized saline solution and suctioned clean.    Just prior to completion of the anastomosis, backbleeding was allowed from the internal carotid artery to flush out all the air.  With completion of the anastomosis and control of the internal carotid artery at the bifurcation, flow was established from the common, up the external carotid artery and, after an appropriate period of time, up the internal carotid artery.  Total clamp time after removal of the shunt was about 1-1/2 minutes.  Hemostasis was obtained with 6-0 Prolene suture, hemoclips, Bovie coagulation, Gelfoam and thrombin, FloSeal.  Intraoperative Doppler showed excellent flow through the internal carotid artery, external carotid artery, and superior thyroidal artery.  Once flow was re-established, the cerebral oximetry went above what it was preop.  Vital signs remained relatively stable.  It was elevated when the artery was clamped, brought down when the shunt was in, and then brought back up after reperfusion.  The patient did have a drop in heart rate down to 34 to 40, not affecting the blood pressure, and got approximately 5 mL max of 1% Xylocaine at the carotid bifurcation of the affected nerve of Tiffanie.  The patient, once hemostasis was obtained, then had the wounds closed in multiple layers with 2-0 Vicryl, and skin was closed with 3-0 Vicryl subcuticular.  Pathology specimen included a cervical lymph node and the carotid plaque.      FINAL DIAGNOSIS:  Severe symptomatic right carotid stenosis with very poor backbleeding, treated with right carotid endarterectomy, right great  saphenous vein patch, and a Sundt shunt.      Dictated By Daniel Nina M.D.  d: 05/22/2025 13:09:28  t: 05/22/2025 13:29:54  [facility] [id number]  JJW/

## 2025-05-22 NOTE — HISTORICAL OFFICE NOTE
[facility]  [address], [place] [address]  [phone]      Nico Harmon  Progress Note  Demographics:  Name: Nico Harmon YOB: 1955  Age: 69, Male Medical Record No: [id number]  Visited Date/Time: 03/03/2025 01:20 PM    Chief Complaints  Follow up after testing    History of Present Illness  69-year-old gentleman with a past medical history of hypertension, hyperlipidemia, back pain, peripheral artery disease and tobacco use  Cardiac cath had mild CAD  Echo reports normal function  Denies chest pain, pressure, shortness of air, lightheadedness, dizziness, presyncope or syncope.  Denies orthopnea, PND or lower limb edema.  Quit smoking about one week ago. Active trying.     Family history includes brother had a quadruple bypass in his early 50s.  Cardiac risk factors Former smoker  Past Medical History  1.Pre-op testing  2.Dizziness  3.SOB (shortness of breath)  4.Mixed hyperlipidemia  5.Essential hypertension  6.Peripheral vascular disease  7.Tobacco dependence due to cigarettes  Family History  1. Brother - S/P CABG x 4  Social History  Smoking status Former smoker  Tobacco usage - No (Non-smoker for personal reasons (finding))  Review of systems  Constitutional No history of Weight Loss, Fevers and Chills  Eyes No history of Blurry vision and Visual changes  ENT No history of Sorethroat  Gastrointestinal No history of Abdominal pain, Nausea and Vomiting  Cardiovascular No history of Chest pain, BROOKS, Palpitations, Syncope, PND, Orthopnea, Edema and Claudication  Genitourinary No history of Dysuria and Nocturia  Musculoskeletal No history of Arthralgias and Myalgias  Respiratory No history of SOB and Cough  Neurological No history of Migraines and Numbness  Endocrine No history of Orthostatic symptoms  Psychiatric No history of Anxiety  Hem/Lymphatic No history of Anemia  Allergy Immunology No history of Asthma  Integumentary No history  of Rashes  Physical Examination  Vitals Right Arm Sitting  / 78 mmHg, Pulse rate 59 bpm, Regular, Height in 5' 8\", BMI: 24.6, Weight in 162 lbs (or) 73 kgs and BSA : 1.89 cc/m²  General Appearance No Acute Distress and Well groomed  Head/Eyes/Ears/Nose/Mouth/Throat EOMI  Neck No JVD  Respiratory Unlabored, Lungs clear with normal breath sounds and Equal bilaterally  Cardiovascular Intact distal pulses and Regular rhythm. Normal rate present. Normal and normal S1 and S2    Gastrointestinal Abdomen soft and Non-tender  Musculoskeletal Normal spine  Gait Normal gait  Strength and tone Normal muscle strength  Upper Extremities No edema  Lower Extremities Pulses 2+ and equal bilaterally  Skin Warm and dry and Intact  Neurologic / Psychiatric Alert and Oriented and Non-focal  Speech Normal speech  Allergies  1.Penicillins [Snomed:[id number]](Reaction:Hives [Snomed:[id number]], Severity:Moderate)  Medications  1.aspirin 81 MG Oral Tab EC, Take 81 mg by mouth daily.  2.HYDROcodone-acetaminophen 5-325 MG Oral Tab, TAKE 1 TO 2 TABLETS BY MOUTH EVERY 4 TO 6 HOURS AS DIRECTED . MAXIMUM DAILY DOSE IS 8 TABLETS  3.ibuprofen 800 MG Oral Tab, Take 800 mg by mouth 3 (three) times daily as needed.  4.losartan-hydroCHLOROthiazide 50-12.5 MG Oral Tab, Take 1 tablet by mouth daily.  5.rosuvastatin 5 MG Oral Tab, Take 1 tablet (5 mg total) by mouth nightly.  6.Sildenafil Citrate 50 MG Oral Tab, Take 1 tablet (50 mg total) by mouth daily as needed for Erectile Dysfunction.  7.SUMAtriptan Succinate (IMITREX) 100 MG Oral Tab, Take 1 tablet (100 mg total) by mouth every 2 (two) hours as needed for Migraine. Use at onset; repeat once after 2 HRS-ONLY 2 IN 24 HR MAX  8.testosterone cypionate 200 MG/ML Intramuscular Solution, Inject 1 mL (200 mg total) into the muscle every 14 (fourteen) days. Use 18 gauge to draw and 22 G to inject.    Impression  1.Pre-op testing  2.Dizziness  3.Abnormal cardiovascular stress test  4.SOB (shortness of  breath)  5.Mixed hyperlipidemia  6.Essential hypertension  7.Tobacco dependence due to cigarettes  Assessment & Plan  1.  Carotid artery disease  2. Hypertension  3. Hyperlipidemia  4. Tobacco use    Cardiac catheterization showed mild CAD.   Carotid ultrasound showed significant right carotid artery stenosis.  Will get a head and neck CTA.  Will also refer to vascular surgery.    Continue with current medications.    Strongly encouraged tobacco cessation.    Recommend low sodium diet, daily exercise and maintain a normal BMI. Recommend monitor blood pressure at home.  Follow-up in 2 to 3 months    Recommend low sodium diet, daily exercise and maintain a normal BMI. Recommend monitor blood pressure at home.  Future appointments  1.Follow up visit - Kelin Downs MD (2 Months)  Miscellaneous  1.Reviewed ankle brachial index, carotid ultrasound, trans thoracic echocardiogram, lower extremity arterial ultrasound with the patient.  2.Weight monitoring (regime/therapy)  Nurses documentation  Refills : None   Upcoming surgeries: None   Assistive devices: None   Verbal EKG order: None   Triage and medications reviewed by: KIMBERLY EDDY        Patient instructions  Medications:  1. Continue current medications.    Testin. Complete CTA of the head and neck   Call [phone] to schedule     Provider Follow Up:  1. Follow up with Dr. Downs in 2 months   2. Follow up with Dr. Nina next available     Please bring in you medication bottles or updated medicine list to your next appointment.  Call [facility] if you have any problems or concerns at [phone]   Diagnostics Details  Ankle Brachial Index 2025  1.The study quality is good.    2.The resting right ankle brachial index is 1.06 consistent with no significant right peripheral vascular disease.    3.The resting left ankle brachial index is 1.08 consistent with no significant left peripheral vascular disease.    4.Mildly reduced bilateral toe brachial indices.    Lower  Extremity Arterial Ultrasound 02/14/2025  1.The study quality is good.    2.No significant stenoses appreciated in the lower extremity arterial systems bilaterally with three vessels runoff to feet.    Carotid Ultrasound 02/14/2025  1.The study quality is good.    2.80-99% stenosis in the proximal right internal carotid artery based on Bluth Criteria. However, the ICA/CCA ratio is 3.4 which suggests 60-79% stenosis. Recommend correlation with CTA carotid artery study, if clinically indicated.    3.1-39% stenosis in the proximal left internal carotid artery based on Bluth Criteria.    4.Bi-directional flow direction in the right vertebral artery . Evidence of a significant stenosis at the proximal subclavian/ innominate artery with a peak systolic velocity is 475 cm/s. However, no significant arm blood pressure difference bilaterally.    5.Antegrade left vertebral artery flow.    6.Bi-directional right vertebral artery flow.    Trans Thoracic Echocardiogram 01/10/2025  1.The study quality is good.    2.The left ventricle is normal in size, wall thickness, and global left ventricular systolic function. The left ventricular ejection fraction is 55-60%. The left ventricle diastolic function is normal. No regional wall motion abnormalities.    3.The right ventricle is normal in size. Right ventricular systolic function is normal.    4.The estimated pulmonary artery systolic pressure is 28 mmHg assuming a right atrial pressure of 3 mmHg.    5.No significant valvular stenosis or regurgitation noted.    Care Providers: José Luis CARLISLE and Kelin Downs MD  Electronically Authenticated by  Kelin Downs MD  03/04/2025 02:55:55 PM  Disclaimer: Components of this note were documented using voice recognition system and are subject to errors not corrected at proofreading. Contact the author of this note for any clarifications.

## 2025-05-22 NOTE — OPERATIVE REPORT
Pre-op Dx: right carotid stenosis. Post-op Dx: : Same with severe stenosis poor back bleeding. Procedure: Right CEA/ Righr GSV patch. Sundt Shunt. Surgeon: Kelle. Asst:Abe Majano. 2L crystalloid. EBL- 250cc

## 2025-05-22 NOTE — CONSULTS
[facility]  Cardiology Consultation    Nico Harmon Patient Status:  Inpatient    1955 MRN [id number]   Location [facility] 4SW-A Attending aDniel Nina MD   Hosp Day # 0 PCP Lasha Fong MD     Reason for Consultation:  Status post carotid endarterectomy    History of Present Illness:  Nico Harmon is a a(n) 70 year old male who presents with elective carotid endarterectomy.  Patient seen in follow-up.  He patient is well-known from my practice.    He is currently doing well.  His main complaint is back pain.  Suspect  musculoskeletal.    He denies chest pain, pressure, heaviness.  Denies orthopnea, PND.    He is euvolemic.    Discussed with Dr. Nina.  Patient did have an episode of bradycardia in the OR.  Blood pressure remained stable.    He has a history of cardiac catheterization.  This was in 2024.  Noted to have mild CAD      History:  Past Medical History[1]  Past Surgical History[2]  Family History[3]   reports that he has been smoking cigarettes. He has a 42 pack-year smoking history. He has never used smokeless tobacco. He reports current alcohol use. He reports that he does not use drugs.    Allergies:  Allergies[4]    Medications:  Current Hospital Medications[5]    Review of Systems:  A comprehensive review of systems was negative if not otherwise mention in above HPI.    /54 (BP Location: Right arm)   Pulse 75   Temp 98.2 °F (36.8 °C) (Temporal)   Resp 15   Ht 167.6 cm (5' 6\")   Wt 158 lb 15.2 oz (72.1 kg)   SpO2 99%   BMI 25.66 kg/m²   Temp (24hrs), Av.7 °F (37.1 °C), Min:98.2 °F (36.8 °C), Max:99.4 °F (37.4 °C)       Intake/Output Summary (Last 24 hours) at 2025 1714  Last data filed at 2025 1400  Gross per 24 hour   Intake 2000 ml   Output 250 ml   Net 1750 ml     Wt Readings from Last 3 Encounters:   25 158 lb 15.2 oz (72.1 kg)   25 162 lb (73.5 kg)   24 157 lb (71.2 kg)       Physical Exam:   General:  Alert and oriented x 3. No apparent distress. No respiratory distress  HEENT: Normocephalic.  Neck: No JVD  Cardiac: Regular rate and rhythm. S1, S2 normal. No murmur.  Lungs: Clear without wheezes, rales, rhonchi or dullness.   Abdomen: Soft, non-tender.   Extremities: Without clubbing, cyanosis or edema.  Peripheral pulses are 2+.  Neurologic: Alert and oriented, normal affect.  Skin: Warm and dry.     Laboratory Data:  Lab Results   Component Value Date    WBC 14.2 05/22/2025    HGB 14.2 05/22/2025    HCT 41.6 05/22/2025    .0 05/22/2025    CREATSERUM 1.01 05/22/2025    BUN 12 05/22/2025     05/22/2025    K 4.3 05/22/2025     05/22/2025    CO2 24.0 05/22/2025     05/22/2025    CA 8.3 05/22/2025    PTT 25.2 05/22/2025    INR 1.08 05/22/2025    PTP 14.2 05/22/2025     No results for input(s): \"TROP\", \"TROPHS\", \"CK\" in the last 168 hours.      Imaging:  Reviewed  Impression:  Active Problems:    Carotid stenosis, right      Status post carotid endarterectomy postop day 0  Hypertension  Hyperlipidemia    Recommendations:    Patient with some atypical chest pain/back pain postprocedure.    Cardiac catheterization recently was unremarkable.    Patient seen postop.  Continue with the nicardipine drip.    On aspirin and Plavix.    Will continue to follow.    Thank you for allowing me to participate in the care of your patient.    Kelin Downs MD  5/22/2025  5:14 PM       [1]   Past Medical History:   Arthritis    Cataract    Chronic hepatitis C (HCC)    G3  F 2-3  Rx 1990's    High blood pressure    High cholesterol    HTN (hypertension)    Peripheral vascular disease   [2]   Past Surgical History:  Procedure Laterality Date    Angioplasty (coronary) Right 2017    Right calf    Leg/ankle surgery proc unlisted Right 1990's    Attached ligaments with anchor   [3]   Family History  Problem Relation Age of Onset    Cancer Father     Cancer Mother         breast    Diabetes Brother     Heart  Disorder Brother    [4]   Allergies  Allergen Reactions    Penicillins ITCHING   [5]   Current Facility-Administered Medications:     ondansetron (Zofran) 4 MG/2ML injection 4 mg, 4 mg, Intravenous, Q6H PRN    metoclopramide (Reglan) 5 mg/mL injection 10 mg, 10 mg, Intravenous, Q8H PRN    lactated ringers infusion, , Intravenous, Continuous    nitroGLYCERIN in dextrose 5% 50 mg/250mL infusion premix, 5-400 mcg/min, Intravenous, Titrated    niCARdipine in sodium chloride 0.86% (carDENE) 20 mg/200mL infusion premix, 5-15 mg/hr, Intravenous, Continuous PRN    phenylephrine in sodium chloride 0.9% (Rubens-Synephrine) 50 mg/250mL infusion premix,  mcg/min, Intravenous, Continuous PRN    aspirin DR tab 81 mg, 81 mg, Oral, Daily    clopidogrel (Plavix) tab 75 mg, 75 mg, Oral, Daily    polyethylene glycol (PEG 3350) (Miralax) 17 g oral packet 17 g, 17 g, Oral, Daily PRN    sennosides (Senokot) tab 17.2 mg, 17.2 mg, Oral, Nightly PRN    bisacodyl (Dulcolax) 10 MG rectal suppository 10 mg, 10 mg, Rectal, Daily PRN    fleet enema (Fleet) rectal enema 133 mL, 1 enema, Rectal, Once PRN    ceFAZolin (Ancef) 2g in 10mL IV syringe premix, 2 g, Intravenous, Q8H    traMADol (Ultram) tab 50 mg, 50 mg, Oral, Q6H PRN    acetaminophen (Tylenol) tab 650 mg, 650 mg, Oral, q6h

## 2025-05-22 NOTE — CONSULTS
CRITICAL CARE CONSULT NOTE:     Patient Name: Nico Harmon  : 1955  MRN: [id number]  Admit Date: 2025  Length of Stay: 0 Days    HPI:  Nico Harmon is a 70 year old male with a past medical history of HTN, HLD, PVD, chronic hep C, and daily tobacco/ETOH dependence, and high grade R carotid stenosis presents for elective R CEA with Dr. Nina.    He presents to the ICU postop op. Discussed with anesthesia and Dr. Nina at the bedside. EBL ~ 250cc. He received 2L crystalloid. No airway difficulties. No rick/urine output. He arrived drowsy, hemodynamically stable on 30 mcgs of nitroglycerin to keep SBP <145.     ROS as above    Past medical history as above    Allergies  Patient is allergic to penicillins.    Physical Exam  General: Drowsy   Neuro: AOx3, non focal, pupils 2mm sluggish  Lungs: Clear  Cardio:  RRR, R neck surgical incision clean dry and intact  Abdomen: Soft, non tended, non distended   Extremities: Warm, no swelling    Hemodynamics  24h Vitals:  VitalLast Value (24 Hour)24 Hour Range  Temp No data recorded  HR No data recorded  BP (Non-Invasive)  No data recorded  BP (A-Line) No data recorded  CVP  could not be evaluated. This SmartLink does not work with rows of the type:   RR No data recorded  SpO2 No data recorded  O2 Therapy       Assessment and Plan:  High grade right carotid stenosis s/p R CEA   H/o HTN  H/o HLD  H/o PVD/PAD  H/o Chronic hep C  H/o Migraines  H/o Daily tobacco/ETOH dependence     Neuro/Psych: Neurologically intact. Per Dr. Nina patient has had issues without narcotics in past and recently off them for about 2 weeks and therefore try to limit if avoid narcotic use. Will give a dose of IV tylenol now.     Cardiovascular: Hemodynamically stable, SBP goal 100-160. ASA/plavix per vascular. Resume home antihypertensives when BP allows.     Pulmonary: Wean O2 as tolerated, check ABG given lethargy     GI: ADAT    Renal/Metabolic: Postop BMP pending, due to void- no  rick intra op, IVF until taking PO    Heme: Postop CBC pending, DVT chemoprophylaxis when ok with surgery team     Infectious Disease: Postop ancef x2 doses    Endocrine:  No acute issues     ICU checklist  Feeding: ADAT  Analgesia: norco, tylenol, MS  Sedation: NA  Thromboembolism PPX: SCDs  Stress Ulcer PPX: NA  Glucose control: <180  Bowel Regimen: PRN  Lines/drains/tubes: PIV, arterial line  Deescalation of antibiotics: NA  Therapies: PT/OT/ST when appropriate    Code Status: Full code     Dispo: ICU    Discussed with critical care intensivist Dr. Mitchell Jones, [facility]  Critical Care Nurse Practitioner

## 2025-05-22 NOTE — PAYOR COMM NOTE
--------------  ADMISSION REVIEW     Payor: [employer]  Subscriber #:  [id number]  Authorization Number: [id number]    Admit date: 5/22/25  Admit time:  8:34 AM       VASCULAR:    History and Physical     HISTORY OF PRESENT ILLNESS:  A 70-year-old white male referred for high-grade right carotid stenosis.  He had been seen and evaluated by Dr. Kelin Downs from [facility], found to have a significant stenosis.  He has no CVA, TIA, visual field defect, or aphasia.  He does have a headache.  It occurred about 9 months ago.  At that time, he had no TIA symptoms.  He had no visual loss, no aphasia.  The patient sometimes has a problem where he gets ataxia and some vertigo.     PAST MEDICAL HISTORY:  He has a history of hypertension and dyslipidemia.     MEDICATIONS:  He is on Crestor 5 mg a day, metoprolol succinate, Imitrex for pain medication, Topamax, losartan-hydrochlorothiazide.     ALLERGIES:  He is allergic to penicillin.     FAMILY HISTORY:  Family history of coronary artery disease and has a family history of a sister having a stroke.     SOCIAL HISTORY:  He has a long history of smoking and still smoking.  He has an opioid addiction due to chronic back pain.  He finally got off that.  He is  with children.  He works as a .     REVIEW OF SYSTEMS:  He has no gangrene, tissue loss, ulceration, or rest pain in lower legs.  He has no hematuria, dysuria, hemoptysis, cough, sputum production.  No weight loss, fever, chills.  No hematemesis or bright red blood per rectum.       PHYSICAL EXAMINATION:    GENERAL:  He is awake, alert.  He is appropriate, in no acute distress.  VITAL SIGNS:  Blood pressure 126/70, heart rate 72, respirations 20.  HEENT:  Pupils equal, round.  Sclerae clear.  Mouth without lesions.  NECK:  No JVD.  He has a right cervical bruit.  LUNGS:  Have breath sounds bilaterally.  HEART:  Normal.  No murmur.  ABDOMEN:  Soft.  No tenderness.  No masses.  EXTREMITIES:   Femoral, popliteal, and pedal pulses all palpable.  Upper extremity pulses are palpable.  No gangrene or tissue loss in the lower legs.  No swelling or tenderness of the joints.  No palpable lymph nodes in the groins.  No clubbing of the fingertips.  NEUROLOGIC:  Moving all 4 extremities.  Oriented x3.     Duplex ultrasound showed a peak velocity of 262 cm/sec from [facility].  Both vertebral arteries showed antegrade flow.  His left side was not significantly narrowed.  Ratio of 3.4 on the right side.     CT angiogram shows 75% ulcerated calcified plaque, somewhat high in the bifurcation.     IMPRESSION:  Patient with a possible symptomatic right carotid stenosis, with history of hypertension and dyslipidemia.  I have recommended a right carotid endarterectomy and vein patch.  Risks and complications including death, myocardial infarction, bleeding, infection, stroke, cranial nerve injury, renal failure, multisystem organ failure, sepsis, cardiac arrest, cranial nerve injury were explained.  Risks and complications of not doing the procedure were explained.  Risks and complications of stenting were explained, either femoral or TCAR, does not appear to be a good candidate for that.  All questions answered for the patient.      BRIEF OP REPORT:    Pre-op Dx: right carotid stenosis.   Post-op Dx: : Same with severe stenosis poor back bleeding. Procedure: Right CEA/ Righr GSV patch. Sundt Shunt.   Surgeon: Kelle.    2L crystalloid. EBL- 250cc     MEDICATIONS ADMINISTERED IN LAST 1 DAY:    heparin in sodium chloride 0.9% ((Porcine)) 5000 UNITS - 500 ML for procedural use       Date Action Dose Route User    5/22/2025 1100 Given 5,000 Units Irrigation Daniel Nina MD          heparin (Porcine) 1000 UNIT/ML injection       Date Action Dose Route User    5/22/2025 1130 Given 2,000 Units Intravenous Wilver Colindres MD    5/22/2025 1116 Given 12,000 Units Intravenous Wilver Colindres MD          lactated ringers  infusion       Date Action Dose Route User    5/22/2025 1347 New Bag (none) Intravenous Amanda Juan, RN       nitroGLYCERIN in dextrose 5% 50 mg/250mL infusion premix       Date Action Dose Route User    5/22/2025 1316 Rate/Dose Change 30 mcg/min Intravenous Wilver Colindres MD    5/22/2025 1312 Rate/Dose Change 10 mcg/min Intravenous Wilver Colindres MD    5/22/2025 1304 New Bag 5 mcg/min Intravenous Wilver Colindres MD          norepinephrine (Levophed) 4 mg/250mL infusion premix       Date Action Dose Route User    5/22/2025 1228 Rate/Dose Change 2 mcg/min Intravenous Wilver Colindres MD    5/22/2025 1105 Rate/Dose Change 6 mcg/min Intravenous Wilver Colindres MD    5/22/2025 1021 Rate/Dose Change 4 mcg/min Intravenous Wilver Colindres MD    5/22/2025 1007 New Bag 2 mcg/min Intravenous Wilver Colindres MD          ondansetron (Zofran) 4 MG/2ML injection       Date Action Dose Route User    5/22/2025 1302 Given 4 mg Intravenous Wilver Colindres MD          phenylephrine (Rubens-Synephrine) 10 MG/ML injection       Date Action Dose Route User    5/22/2025 1019 Given 100 mcg Intravenous Wilevr Colindres MD    5/22/2025 1005 Given 100 mcg Intravenous Wilver Colindres MD    5/22/2025 1003 Given 100 mcg Intravenous Wilver Colindres MD    5/22/2025 1000 Given 100 mcg Intravenous Wilver Colindres MD    5/22/2025 0957 Given 100 mcg Intravenous Wilver Colindres MD       sodium chloride 0.9% infusion       Date Action Dose Route User    5/22/2025 0948 New Bag (none) Intravenous Wilver Colindres MD          sugammadex (Bridion) 200 MG/2ML injection       Date Action Dose Route User    5/22/2025 1313 Given 200 mg Intravenous Wilver Colindres MD          thrombin (Thrombin-JMI) 5000 units topical solution       Date Action Dose Route User    5/22/2025 1233 Given 20,000 Units Topical (Right Neck) Daniel Nina MD             Vitals (last day)       Date/Time Temp Pulse Resp BP SpO2 Weight O2 Device O2 Flow Rate (L/min) [facility] [employer] [facility]    05/22/25 1400 -- -- -- -- -- 158 lb 15.2 oz (72.1 kg) -- --     05/22/25 1345 98.6 °F (37 °C) 94 18 -- 99 % -- -- --     05/22/25 1340 99.4 °F (37.4 °C) 88 9 -- 98 % -- -- -- ROCIO

## 2025-05-22 NOTE — ANESTHESIA PROCEDURE NOTES
Peripheral IV  Date/Time: 5/22/2025 10:00 AM  Inserted by: Wilver Colindres MD    Placement  Needle size: 18 G  Laterality: right  Location: forearm  Local anesthetic: none  Site prep: alcohol  Technique: anatomical landmarks  Attempts: 1

## 2025-05-22 NOTE — ANESTHESIA PROCEDURE NOTES
Airway  Date/Time: 5/22/2025 9:54 AM  Reason: elective    Airway not difficult    General Information and Staff   Patient location during procedure: OR  Anesthesiologist: Wilver Colindres MD  Performed: anesthesiologist   Performed by: Wilver Colindres MD  Authorized by: Wilver Colindres MD        Indications and Patient Condition  Indications for airway management: anesthesia  Sedation level: deep      Preoxygenated: yesPatient position: sniffing    Mask difficulty assessment: 1 - vent by mask    Final Airway Details    Final airway type: endotracheal airway    Successful airway: ETT  Cuffed: yes   Successful intubation technique: direct laryngoscopy  Endotracheal tube insertion site: oral  Blade: Jesus  Blade size: #3  ETT size (mm): 7.5    Cormack-Lehane Classification: grade I - full view of glottis  Placement verified by: capnometry   Cuff volume (mL): 9  Measured from: lips  ETT to lips (cm): 22  Number of attempts at approach: 1

## 2025-05-22 NOTE — ANESTHESIA PROCEDURE NOTES
Arterial Line    Date/Time: 5/22/2025 9:55 AM    Performed by: Wilver Colindres MD  Authorized by: Wilver Colindres MD    General Information and Staff    Procedure Start:  5/22/2025 9:55 AM  Procedure End:  5/22/2025 9:58 AM  Anesthesiologist:  Wilver Colindres MD  Performed By:  Anesthesiologist  Patient Location:  OR  Indication: continuous blood pressure monitoring and blood sampling needed    Site Identification: real time ultrasound guided and surface landmarks    Preanesthetic Checklist: 2 patient identifiers, IV checked, risks and benefits discussed, monitors and equipment checked, pre-op evaluation, timeout performed, anesthesia consent and sterile technique used    Procedure Details    Catheter Size:  20 G  Catheter Length:  1 and 3/4 inch  Catheter Type:  Arrow  Seldinger Technique?: Yes    Laterality:  Left  Site:  Radial artery  Site Prep: chlorhexidine    Line Secured:  Tape and Tegaderm    Assessment    Events: patient tolerated procedure well with no complications      Medications  5/22/2025 9:55 AM      Additional Comments

## 2025-05-23 LAB
ALBUMIN SERPL-MCNC: 3.5 G/DL (ref 3.2–4.8)
ALBUMIN/GLOB SERPL: 1.9 {RATIO} (ref 1–2)
ALP LIVER SERPL-CCNC: 37 U/L (ref 45–117)
ALT SERPL-CCNC: 9 U/L (ref 10–49)
ANION GAP SERPL CALC-SCNC: 5 MMOL/L (ref 0–18)
AST SERPL-CCNC: 23 U/L (ref ?–34)
BILIRUB SERPL-MCNC: 0.5 MG/DL (ref 0.2–1.1)
BUN BLD-MCNC: 9 MG/DL (ref 9–23)
CALCIUM BLD-MCNC: 7.6 MG/DL (ref 8.7–10.6)
CHLORIDE SERPL-SCNC: 113 MMOL/L (ref 98–112)
CO2 SERPL-SCNC: 25 MMOL/L (ref 21–32)
CREAT BLD-MCNC: 0.75 MG/DL (ref 0.7–1.3)
EGFRCR SERPLBLD CKD-EPI 2021: 98 ML/MIN/1.73M2 (ref 60–?)
ERYTHROCYTE [DISTWIDTH] IN BLOOD BY AUTOMATED COUNT: 15.1 %
GLOBULIN PLAS-MCNC: 1.8 G/DL (ref 2–3.5)
GLUCOSE BLD-MCNC: 103 MG/DL (ref 70–99)
HCT VFR BLD AUTO: 40 % (ref 39–53)
HGB BLD-MCNC: 13.5 G/DL (ref 13–17.5)
MCH RBC QN AUTO: 32.5 PG (ref 26–34)
MCHC RBC AUTO-ENTMCNC: 33.8 G/DL (ref 31–37)
MCV RBC AUTO: 96.2 FL (ref 80–100)
OSMOLALITY SERPL CALC.SUM OF ELEC: 295 MOSM/KG (ref 275–295)
PLATELET # BLD AUTO: 196 10(3)UL (ref 150–450)
POTASSIUM SERPL-SCNC: 3.8 MMOL/L (ref 3.5–5.1)
PROT SERPL-MCNC: 5.3 G/DL (ref 5.7–8.2)
RBC # BLD AUTO: 4.16 X10(6)UL (ref 3.8–5.8)
SODIUM SERPL-SCNC: 143 MMOL/L (ref 136–145)
WBC # BLD AUTO: 13.8 X10(3) UL (ref 4–11)

## 2025-05-23 PROCEDURE — 99233 SBSQ HOSP IP/OBS HIGH 50: CPT

## 2025-05-23 RX ORDER — NICOTINE 21 MG/24HR
1 PATCH, TRANSDERMAL 24 HOURS TRANSDERMAL DAILY
Status: DISCONTINUED | OUTPATIENT
Start: 2025-05-23 | End: 2025-05-25

## 2025-05-23 RX ORDER — ROSUVASTATIN CALCIUM 5 MG/1
5 TABLET, COATED ORAL NIGHTLY
Status: DISCONTINUED | OUTPATIENT
Start: 2025-05-23 | End: 2025-05-25

## 2025-05-23 NOTE — PROGRESS NOTES
Weaned off Cardene this AM. No complaints except incisional pain. Wife at bedside. Neuro intact. Incisions clean/dry. Discussed his surgery- severe ulcerated plaque/ poor back bleeding. Told no Tobacco.- possible discharge tomorrow/ Told no work at least 4 weeks

## 2025-05-23 NOTE — PROGRESS NOTES
Progress Note  Nico Harmon Patient Status:  Inpatient    1955 MRN [id number]   Location [facility] 4SW-A Attending Daniel Nina MD   Hosp Day # 1 PCP Lasha Fong MD     Subjective:  Indicates mild neck discomfort otherwise feels well. No chest pain, palpitations, dyspnea, dizziness, or near syncope/syncope.  Off all antihypertensives currently with stable BP's    Objective:  Physical Exam:   /53 (BP Location: Right arm)   Pulse 73   Temp 98.1 °F (36.7 °C) (Temporal)   Resp 11   Ht 5' 6\" (1.676 m)   Wt 161 lb 2.5 oz (73.1 kg)   SpO2 95%   BMI 26.01 kg/m²   Temp (24hrs), Av.7 °F (37.1 °C), Min:98.1 °F (36.7 °C), Max:99.4 °F (37.4 °C)       Intake/Output Summary (Last 24 hours) at 2025 0839  Last data filed at 2025 0540  Gross per 24 hour   Intake 4796.1 ml   Output 1900 ml   Net 2896.1 ml     Wt Readings from Last 3 Encounters:   25 161 lb 2.5 oz (73.1 kg)   25 162 lb (73.5 kg)   24 157 lb (71.2 kg)     Telemetry: Sinus bradycardia in the 50's  General: Alert and oriented in no apparent distress lying comfortably in bed with a dress R neck.  HEENT: No focal deficits.  Neck: No JVD  Cardiac: Regular rate and rhythm, S1, S2 normal, rub or gallop.  Lungs: Clear without wheezes, rales, rhonchi or dullness.  Normal excursions and effort.  Abdomen: Soft, non-tender.   Extremities: Without clubbing, cyanosis or edema.  Peripheral pulses are 2+.  Neurologic: Alert and oriented, normal affect.  Skin: Warm and dry.        Intake/Output:    Intake/Output Summary (Last 24 hours) at 2025 0839  Last data filed at 2025 0540  Gross per 24 hour   Intake 4796.1 ml   Output 1900 ml   Net 2896.1 ml       Last 3 Weights   25 0500 161 lb 2.5 oz (73.1 kg)   25 1400 158 lb 15.2 oz (72.1 kg)   25 1438 160 lb (72.6 kg)   25 1154 162 lb (73.5 kg)   24 1008 157 lb (71.2 kg)       Labs:  Recent Labs   Lab 25  1356 25  0512   GLU  174* 103*   BUN 12 9   CREATSERUM 1.01 0.75   EGFRCR 81 98   CA 8.3* 7.6*    143   K 4.3 3.8   * 113*   CO2 24.0 25.0     Recent Labs   Lab 05/22/25  1355 05/23/25  0512   RBC 4.40 4.16   HGB 14.2 13.5   HCT 41.6 40.0   MCV 94.5 96.2   MCH 32.3 32.5   MCHC 34.1 33.8   RDW 15.1 15.1   WBC 14.2* 13.8*   .0 196.0         Recent Labs   Lab 05/22/25  1716   TROPHS 4       Diagnostics:             Medications:  Scheduled Medications[1]  Medication Infusions[2]    Assessment/Plan:    Carotid stenosis s/p elective carotid endarterectomy 5/22/25  On ASA, Plavix, statin  HTN  BP at goal  OP takes losartan hydrochlorothiazide 100/25mg  HLD  Rosuvastatin 5 mg daily  Alcohol misuse disorder  Tobacco misuse disorder  Congratulated on cessation 1 week ago  Hx of opioid use disorder  Reportedly doing well on suboxone  Hx of hepatitis C  S/p DAA treatment reports undetectable viral load post therapy    PLAN  Continue DAPT and statin  Resume antihypertensives gradually - would begin with moderate dose losartan if BP's elevate today  Hopefully home tomorrow        Lee Andrade PA-C  5/23/2025  8:39 AM       Physician addendum:  I seen examined the patient agree with note as written by PA above, MDM per me    Carotid artery stenosis status post elective carotid endarterectomy on 522/25  Hypertension  Hyperlipidemia  Alcohol misuse tobacco misuse as well as opioid use disorder  History of hep C seems to be treated    Plan:  Continue DAPT and statin  Resume antihypertensives gradually as BP is slightly lower would begin with smaller dose of his home losartan  Hopefully DC tomorrow    L3    Casimiro Thurman MD  Interventional cardiology  [facility]       [1]    rosuvastatin  5 mg Oral Nightly    aspirin  81 mg Oral Daily    clopidogrel  75 mg Oral Daily    acetaminophen  650 mg Oral q6h   [2]    lactated ringers 125 mL/hr at 05/23/25 0558    nitroGLYCERIN in dextrose 5% Stopped (05/22/25 1630)     niCARdipine Stopped (05/23/25 0700)    phenylephrine

## 2025-05-23 NOTE — PROGRESS NOTES
Nico Harmon Patient Status:  Inpatient    1955 MRN [id number]   Location [facility] 4SW-A Attending Daniel Nina MD   Hosp Day # 1 PCP Lasha Fong MD     Critical Care Progress Note          Subjective/24h events:  No overnight events. Complaining of sore neck.    Medications:  Scheduled Medications[1]           Intake/Output Summary (Last 24 hours) at 2025 0711  Last data filed at 2025 0540  Gross per 24 hour   Intake 4796.1 ml   Output 1900 ml   Net 2896.1 ml       /44   Pulse 59   Temp 99.1 °F (37.3 °C) (Temporal)   Resp 12   Ht 167.6 cm (5' 6\")   Wt 161 lb 2.5 oz (73.1 kg)   SpO2 93%   BMI 26.01 kg/m²     Physical Exam:    General Appearance: Alert, cooperative, no acute distress, appears stated age  Neck: No JVD, no adenopathy, trachea midline, right surgical dressing clean/dry/intact  Lungs: Clear to auscultation bilaterally, respirations unlabored  Heart: Regular rate and rhythm, S1 and S2 normal, no murmur, rub or gallop  Abdomen: Soft, non-tender, bowel sounds active all four quadrants, no masses, no organomegaly  Extremities: Atraumatic, no cyanosis or edema, capillary refill <3 sec.    Pulses: 2+ and symmetric all extremities  Skin: Skin color, texture, turgor normal for ethnicity, no rashes or lesions, warm and dry, right groin surgical dressing clean/dry/intact  Neurologic: Normal strength    Recent Labs   Lab 25  0512   RBC 4.16   HGB 13.5   HCT 40.0   MCV 96.2   MCH 32.5   MCHC 33.8   RDW 15.1   WBC 13.8*   .0     Recent Labs   Lab 25  1356 25  0512   * 103*   BUN 12 9   CREATSERUM 1.01 0.75   CA 8.3* 7.6*   ALB  --  3.5    143   K 4.3 3.8   * 113*   CO2 24.0 25.0   ALKPHO  --  37*   AST  --  23   ALT  --  9*   BILT  --  0.5   TP  --  5.3*     Recent Labs   Lab 25  1402   ABGPHT 7.30*   UHQMEE3N 43   DXSGS6X 116*   ABGHCO3 20.9*   ABGBE -5.1*   TEMP 98.6   LEXUS Not Applicable   SITE Arterial Line   DEV  Simple Oxygen Mask   THGB 12.8*     No results for input(s): \"BNP\" in the last 168 hours.  No results for input(s): \"TROP\", \"CK\" in the last 168 hours.    No results found.       Assessment/Plan:    Nico Harmon is a 70 year old male admitted to ICU 5/22 s/p right CEA with Dr. Nina.    Problems:  High grade right carotid stenosis s/p R CEA 5/22  Leukocytosis  H/o HTN  H/o HLD  H/o PVD/PAD  H/o Chronic hep C  H/o Migraines  H/o Daily tobacco/ETOH dependence   H/o opioid use        Neuro/Psych:  H/o Migraines  H/o Daily tobacco/ETOH dependence   H/o opioid use  -Avoid IV narcotics  -Scheduled tylenol  -Nicotine patch    Cardiovascular:  High grade right carotid stenosis s/p R CEA 5/22, POD#1  H/o HTN  H/o HLD  H/o PVD/PAD  -Cardene gtt off this morning, SBP goal 100-145  -Neurovascular checks per protocol  -ASA/Plavix  -Neurovascular surgery following  -BP management per cardiology     Pulmonary: On room air, encourage IS. No acute ICU issues.    GI:  H/o Chronic hep C  -Stable LFTs    Renal/Metabolic: Adequate urine output, no kidney injury. No acute ICU issues.    Heme: No anemia or thrombocytopenia, no acute ICU issues.    ID:  Leukocytosis-may be reactive post op  -Low grade temp 99 overnight  -s/p post op Ancef    Endocrine: No history of endocrinopathies. No acute ICU issues.        ICU checklist  Feeding: ADAT  Analgesia: PRN tylenol  Sedation: n/a  Thromboembolism PPX: SCD  Stress Ulcer PPX: n/a  Glucose control: <180  Bowel Regimen: PRN bowel regimen  Lines/drains/tubes: PIV  Deescalation of antibiotics: s/p Ancef  Therapies: PT/OT/ST when appropriate  Dispo: Transfer to tele floor if okay with vascular surgery    Code Status: Full Code    Plan of care discussed with intensivist, Dr. Mitchell Kirkland.      Evelin King, [facility]-BC  Critical Care APRN  [id number]          [1]    aspirin  81 mg Oral Daily    clopidogrel  75 mg Oral Daily    acetaminophen  650 mg Oral q6h

## 2025-05-23 NOTE — PAYOR COMM NOTE
--------------  5/23 CONTINUED STAY REVIEW    Payor: [employer]  Subscriber #:  [id number]  Authorization Number: [id number]    CRITICAL CARE:      Subjective/24h events:  No overnight events. Complaining of sore neck.     Medications:  [Scheduled Medications]    [Scheduled Medications]   aspirin  81 mg Oral Daily    clopidogrel  75 mg Oral Daily    acetaminophen  650 mg Oral q6h       /44   Pulse 59   Temp 99.1 °F (37.3 °C) (Temporal)   Resp 12   Ht 167.6 cm (5' 6\")   Wt 161 lb 2.5 oz (73.1 kg)   SpO2 93%   BMI 26.01 kg/m²      Physical Exam:     General Appearance: Alert, cooperative, no acute distress, appears stated age  Neck: No JVD, no adenopathy, trachea midline, right surgical dressing clean/dry/intact  Lungs: Clear to auscultation bilaterally, respirations unlabored  Heart: Regular rate and rhythm, S1 and S2 normal, no murmur, rub or gallop  Abdomen: Soft, non-tender, bowel sounds active all four quadrants, no masses, no organomegaly  Extremities: Atraumatic, no cyanosis or edema, capillary refill <3 sec.    Pulses: 2+ and symmetric all extremities  Skin: Skin color, texture, turgor normal for ethnicity, no rashes or lesions, warm and dry, right groin surgical dressing clean/dry/intact  Neurologic: Normal strength         Recent Labs   Lab 05/23/25  0512   RBC 4.16   HGB 13.5   HCT 40.0   MCV 96.2   MCH 32.5   MCHC 33.8   RDW 15.1   WBC 13.8*   .0           Recent Labs   Lab 05/22/25  1356 05/23/25  0512   * 103*   BUN 12 9   CREATSERUM 1.01 0.75   CA 8.3* 7.6*   ALB  --  3.5    143   K 4.3 3.8   * 113*   CO2 24.0 25.0   ALKPHO  --  37*   AST  --  23   ALT  --  9*   BILT  --  0.5   TP  --  5.3*          Recent Labs   Lab 05/22/25  1402   ABGPHT 7.30*   WHPWGD9S 43   BPTJG8V 116*   ABGHCO3 20.9*   ABGBE -5.1*   TEMP 98.6   LEXUS Not Applicable   SITE Arterial Line   DEV Simple Oxygen Mask   THGB 12.8*      No results for input(s): \"BNP\" in the last 168  hours.  No results for input(s): \"TROP\", \"CK\" in the last 168 hours.     No results found.         Assessment/Plan:     Nico Harmon is a 70 year old male admitted to ICU 5/22 s/p right CEA with Dr. Nina.     Problems:  High grade right carotid stenosis s/p R CEA 5/22  Leukocytosis  H/o HTN  H/o HLD  H/o PVD/PAD  H/o Chronic hep C  H/o Migraines  H/o Daily tobacco/ETOH dependence   H/o opioid use           Neuro/Psych:  H/o Migraines  H/o Daily tobacco/ETOH dependence   H/o opioid use  -Avoid IV narcotics  -Scheduled tylenol  -Nicotine patch     Cardiovascular:  High grade right carotid stenosis s/p R CEA 5/22, POD#1  H/o HTN  H/o HLD  H/o PVD/PAD  -Cardene gtt off this morning, SBP goal 100-145  -Neurovascular checks per protocol  -ASA/Plavix  -Neurovascular surgery following  -BP management per cardiology      Pulmonary: On room air, encourage IS. No acute ICU issues.     GI:  H/o Chronic hep C  -Stable LFTs     Renal/Metabolic: Adequate urine output, no kidney injury. No acute ICU issues.     Heme: No anemia or thrombocytopenia, no acute ICU issues.     ID:  Leukocytosis-may be reactive post op  -Low grade temp 99 overnight  -s/p post op Ancef     Endocrine: No history of endocrinopathies. No acute ICU issues.           ICU checklist  Feeding: ADAT  Analgesia: PRN tylenol  Sedation: n/a  Thromboembolism PPX: SCD  Stress Ulcer PPX: n/a  Glucose control: <180  Bowel Regimen: PRN bowel regimen  Lines/drains/tubes: PIV  Deescalation of antibiotics: s/p Ancef  Therapies: PT/OT/ST when appropriate  Dispo: Transfer to tele floor if okay with vascular surgery        CARDS:    Assessment/Plan:     Carotid stenosis s/p elective carotid endarterectomy 5/22/25  On ASA, Plavix, statin  HTN  BP at goal  OP takes losartan hydrochlorothiazide 100/25mg  HLD  Rosuvastatin 5 mg daily  Alcohol misuse disorder  Tobacco misuse disorder  Congratulated on cessation 1 week ago  Hx of opioid use disorder  Reportedly doing well on  suboxone  Hx of hepatitis C  S/p DAA treatment reports undetectable viral load post therapy     PLAN  Continue DAPT and statin  Resume antihypertensives gradually - would begin with moderate dose losartan if BP's elevate today  Hopefully home tomorrow        VASCULAR:    Weaned off Cardene this AM. No complaints except incisional pain. Wife at bedside. Neuro intact. Incisions clean/dry. Discussed his surgery- severe ulcerated plaque/ poor back bleeding. Told no Tobacco.- possible discharge tomorrow/ Told no work at least 4 weeks       MEDICATIONS ADMINISTERED IN LAST 1 DAY:  acetaminophen (Tylenol) tab 650 mg       Date Action Dose Route User    5/23/2025 1323 Given 650 mg Oral Kole Cordon RN    5/23/2025 0753 Given 650 mg Oral Kole Cordon RN    5/23/2025 0210 Given 650 mg Oral Ludmila Hinotn RN    5/22/2025 1955 Given 650 mg Oral Ludmila Hinton RN          aspirin DR tab 81 mg       Date Action Dose Route User    5/23/2025 0753 Given 81 mg Oral Kole Cordon RN          ceFAZolin (Ancef) 2g in 10mL IV syringe premix       Date Action Dose Route User    5/23/2025 0208 New Bag 2 g Intravenous Ludmila Hinton RN    5/22/2025 1743 New Bag 2 g Intravenous Amanda Juan RN          clopidogrel (Plavix) tab 75 mg       Date Action Dose Route User    5/23/2025 0753 Given 75 mg Oral Kole Cordon RN          lactated ringers infusion       Date Action Dose Route User    5/23/2025 0558 New Bag (none) Intravenous Ludmila Hinton RN    5/22/2025 2142 New Bag (none) Intravenous Ludmila Hinton RN          niCARdipine in sodium chloride 0.86% (carDENE) 20 mg/200mL infusion premix       Date Action Dose Route User    5/23/2025 0545 Rate/Dose Change 2.5 mg/hr Intravenous Ludmila Hinton RN    5/23/2025 0521 New Bag 5 mg/hr Intravenous Ludmila Hinton RN    5/23/2025 0520 Rate/Dose Change 5 mg/hr Intravenous Lumdila Hinton RN    5/23/2025 0220 New Bag 7.5 mg/hr Intravenous Ludmila Hinton  RN    5/22/2025 2354 New Bag 7.5 mg/hr Intravenous Ludmila Hinton RN    5/22/2025 2142 New Bag 7.5 mg/hr Intravenous Ludmila Hinton RN    5/22/2025 2015 Rate/Dose Change 7.5 mg/hr Intravenous Ludmila Hinton RN    5/22/2025 1842 Rate/Dose Change 5 mg/hr Intravenous Amanda Juan RN    5/22/2025 1817 New Bag 7.5 mg/hr Intravenous Amanda Juan, ALFRED    5/22/2025 1626 Rate/Dose Change 7.5 mg/hr Intravenous Amanda Juan, ALFRED    5/22/2025 1618 New Bag 5 mg/hr Intravenous Amanda Juan, ALFRED          nicotine (Nicoderm CQ) 21 MG/24HR patch 1 patch       Date Action Dose Route User    5/23/2025 1028 Patch Applied 1 patch Transdermal (Left Upper Arm) Kole Cordon RN          traMADol (Ultram) tab 50 mg       Date Action Dose Route User    5/23/2025 1323 Given 50 mg Oral Kole Cordon RN    5/23/2025 0753 Given 50 mg Oral Kole Cordon RN    5/22/2025 2351 Given 50 mg Oral Ludmila Hinton RN            Vitals (last day)       Date/Time Temp Pulse Resp BP SpO2 Weight O2 Device O2 Flow Rate (L/min) [facility]    05/23/25 1300 -- 62 13 -- 99 % -- -- -- SK    05/23/25 1200 98.6 °F (37 °C) 68 14 121/57 97 % -- None (Room air) -- SK    05/23/25 1100 -- 56 16 123/58 97 % -- -- -- SK    05/23/25 1000 -- 65 21 133/47 96 % -- -- -- SK    05/23/25 0926 -- 55 12 118/57 96 % -- -- -- SK    05/23/25 0900 -- 54 13 120/64 96 % -- -- -- SK    05/23/25 0830 -- 53 14 114/56 97 % -- -- -- SK    05/23/25 0800 98.1 °F (36.7 °C) 73 11 130/53 95 % -- None (Room air) -- SK    05/23/25 0700 -- 57 15 99/52 96 % -- -- -- MP    05/23/25 0600 -- 59 12 110/44 93 % -- -- -- MP    05/23/25 0545 -- 61 16 104/45 93 % -- -- --     05/23/25 0500 99.1 °F (37.3 °C) 59 12 -- 94 % 161 lb 2.5 oz (73.1 kg) -- -- MP    05/23/25 0400 -- 56 14 102/47 92 % -- None (Room air) --     05/23/25 0300 -- 55 10 -- 93 % -- -- --     05/23/25 0200 -- 53 13 -- 91 % -- -- --     05/23/25 0100 -- 56 11 -- 95 % -- -- --     05/23/25 0000 99.4 °F (37.4 °C) 64  12 98/48 96 % -- -- --     05/22/25 2300 -- 56 10 -- 92 % -- -- --     05/22/25 2200 -- 56 12 -- 93 % -- -- --     05/22/25 2100 -- 54 13 -- 95 % -- -- --     05/22/25 2000 98.3 °F (36.8 °C) 70 17 121/53 97 % -- None (Room air) --     05/22/25 1845 -- 58 10 110/49 -- -- -- -- ROCIO    05/22/25 1821 -- 66 10 112/52 99 % -- None (Room air) -- ROCIO    05/22/25 1800 -- 77 16 -- 99 % -- -- -- ROCIO    05/22/25 1730 -- 58 17 -- 98 % -- -- --     05/22/25 1715 -- 57 10 -- 98 % -- -- --     05/22/25 1700 -- 60 14 -- 98 % -- -- -- ROCIO    05/22/25 1645 -- 75 27 116/57 -- -- -- -- ROCIO    05/22/25 1615 -- 75 15 117/54 99 % -- -- --     05/22/25 1600 98.2 °F (36.8 °C) 54 12 -- 97 % -- Nasal cannula 2 L/min     05/22/25 1515 -- -- -- -- 96 % -- -- --     05/22/25 1500 -- 62 13 -- 96 % -- -- -- ROCIO    05/22/25 1445 -- 77 17 -- 93 % -- -- -- ROCIO    05/22/25 1430 -- 81 12 -- 97 % -- -- -- ROCIO    05/22/25 1415 -- 82 9 -- 98 % -- -- -- ROCIO    05/22/25 1405 -- 84 18 -- 97 % -- -- --

## 2025-05-23 NOTE — CONSULTS
[facility]     Nico Harmon Patient Status:  Inpatient    1955 MRN [id number]   Location [facility] 4SW-A Attending Daniel Nina MD   Hosp Day # 1 PCP Lasha Fong MD     Reason for consult: Medical management    Requested by: Dr. Daniel Nina    History of Present Illness: Nico Harmon is a 70 year old male with with a medical history include hypertension hyperlipidemia peripheral vascular disease chronic hepatitis C type, peripheral neuropathy,  chronic pain syndrome and tobacco/ETOH dependence undergone right carotid endarterectomy by Dr. Nina for high-grade right carotid stenosis.  He is admitted to the ICU postop  He is on nitroglycerin drip to maintain the blood pressure below 145  He is complaining of pain at the surgical site    Past Medical History:Past Medical History[1]     Past Surgical History: Past Surgical History[2]    Social History:  reports that he has been smoking cigarettes. He has a 42 pack-year smoking history. He has never used smokeless tobacco. He reports current alcohol use. He reports that he does not use drugs.    Family History: Family History[3]    Allergies: Allergies[4]    Medications:  Medications Ordered Prior to Encounter[5]    Review of Systems:   A comprehensive 14 point review of systems was completed.    Pertinent positives and negatives noted in the HPI.    Physical Exam:    /53 (BP Location: Right arm)   Pulse 73   Temp 98.1 °F (36.7 °C) (Temporal)   Resp 11   Ht 5' 6\" (1.676 m)   Wt 161 lb 2.5 oz (73.1 kg)   SpO2 95%   BMI 26.01 kg/m²   General: No acute distress. Alert and oriented x 3.  HEENT: Normocephalic atraumatic. Moist mucous membranes. EOM-I. PERRLA. Anicteric.  Right neck surgical incision covered by dressing dry  Neck: No lymphadenopathy. No JVD. No carotid bruits.  Respiratory: Clear to auscultation bilaterally. No wheezes. No rhonchi.  Cardiovascular: S1, S2. Regular rate and rhythm. No murmurs, rubs or  gallops. Equal pulses.   Chest and Back: No tenderness or deformity.  Abdomen: Soft, nontender, nondistended.  Positive bowel sounds. No rebound, guarding or organomegaly.  Neurologic: No focal neurological deficits. CNII-XII grossly intact.  Musculoskeletal: Moves all extremities.  Extremities: No edema or cyanosis.  Integument: No rashes or lesions.   Psychiatric: Appropriate mood and affect.      Diagnostic Data:      Labs:  Recent Labs   Lab 05/22/25  1355 05/23/25  0512   WBC 14.2* 13.8*   HGB 14.2 13.5   MCV 94.5 96.2   .0 196.0   INR 1.08  --        Recent Labs   Lab 05/22/25  1356 05/23/25  0512   * 103*   BUN 12 9   CREATSERUM 1.01 0.75   CA 8.3* 7.6*   ALB  --  3.5    143   K 4.3 3.8   * 113*   CO2 24.0 25.0   ALKPHO  --  37*   AST  --  23   ALT  --  9*   BILT  --  0.5   TP  --  5.3*       Recent Labs   Lab 05/22/25  1355   PTP 14.2   INR 1.08       No results for input(s): \"TROP\", \"CK\" in the last 168 hours.    Imaging: Imaging data reviewed in Epic.      ASSESSMENT / PLAN:     Status post right carotid endarterectomy on 5/22/2025      Hemodynamically stable blood pressure well-controlled     Started aspirin, Plavix and statin    2.  Chronic pain syndrome holding outpatient medic pain medication  3 . history of chronic hepatitis C, followed by gastroenterology  4.  History of hypogonadism on replacement therapy currently on hold due to surgery  5.  Hyperlipidemia on statin  6.  History of peripheral vascular disease  and peripheral neuropathy    Quality:  DVT Prophylaxis: SCDs  CODE status: Full  Santiago: None    Plan of care discussed with patient    Lasha Fong MD  5/23/2025         [1]   Past Medical History:   Arthritis    Cataract    Chronic hepatitis C (HCC)    G3  F 2-3  Rx 1990's    High blood pressure    High cholesterol    HTN (hypertension)    Peripheral vascular disease   [2]   Past Surgical History:  Procedure Laterality Date    Angioplasty (coronary) Right 2017     Right calf    Leg/ankle surgery proc unlisted Right 1990's    Attached ligaments with anchor   [3]   Family History  Problem Relation Age of Onset    Cancer Father     Cancer Mother         breast    Diabetes Brother     Heart Disorder Brother    [4]   Allergies  Allergen Reactions    Penicillins ITCHING   [5]   No current facility-administered medications on file prior to encounter.     Current Outpatient Medications on File Prior to Encounter   Medication Sig Dispense Refill    anastrozole 1 MG Oral Tab tab Take 0.5 tablets (0.5 mg total) by mouth twice a week.      Choline-Inositol-Methionine 50-50-25 MG/ML Intramuscular Solution Inject 1 mg into the muscle once a week.      NON FORMULARY Take 25 mg by mouth 3 (three) times a week. Enclomiphene Citrate      TESTOSTERONE IM Inject 75 mg into the muscle once a week.      buprenorphine-naloxone 8-2 MG Sublingual Film Place 1 Film under the tongue daily.      aspirin 81 MG Oral Tab EC Take 1 tablet (81 mg total) by mouth in the morning.      fluticasone propionate 50 MCG/ACT Nasal Suspension 1 spray by Each Nare route in the morning and 1 spray before bedtime. (Patient taking differently: 1 spray by Each Nare route 2 (two) times daily as needed.) 1 each 2    diclofenac (VOLTAREN) 1 % External Gel Apply 2 g topically 4 (four) times daily. (Patient taking differently: Apply 2 g topically 4 (four) times daily as needed.) 100 g 0    testosterone cypionate 200 MG/ML Intramuscular Solution Inject 1 mL (200 mg total) into the muscle every 14 (fourteen) days. Use 18 gauge to draw and 22 G to inject. 10 mL 0    ibuprofen 800 MG Oral Tab Take 1 tablet (800 mg total) by mouth as needed in the morning and 1 tablet (800 mg total) as needed at noon and 1 tablet (800 mg total) as needed in the evening.      SUMAtriptan Succinate (IMITREX) 100 MG Oral Tab Take 1 tablet (100 mg total) by mouth every 2 (two) hours as needed for Migraine. Use at onset; repeat once after 2 HRS-ONLY 2 IN  24 HR MAX 9 tablet 1    pregabalin 75 MG Oral Cap Take 1 capsule (75 mg total) by mouth in the morning and 1 capsule (75 mg total) before bedtime.      Sildenafil Citrate 50 MG Oral Tab Take 1 tablet (50 mg total) by mouth daily as needed for Erectile Dysfunction. 30 tablet 5

## 2025-05-23 NOTE — PLAN OF CARE
Received pt at change of shift alert and oriented x4.  Pt c/o tenderness to R groin and pain to R neck.  Scheduled tylenol given and PRN ultram with fair relief.  R neck with slight swelling noted, Dressing C/D/I, area soft with no bleeding.  R groin with slight bruising noted.  Neuros intact.  On room air.  Cardene gtt to keep -145.  Stood at bedside to void, c/o burning.  States he felt shaky.  Lauren removed this am.

## 2025-05-23 NOTE — PLAN OF CARE
Received pt at change of shift. Pt alert and oriented x4 on room air. Q4 neuros. Intact. BP stable off cardene. VSS. Afebrile. R leg site clean dry intact painted with betadine. R neck incision clean dry intact painted with betadine. Worked with PT/OT today. Will continue with plan of care.

## 2025-05-23 NOTE — PHYSICAL THERAPY NOTE
PHYSICAL THERAPY EVALUATION - INPATIENT     Room Number: 453/453-A  Evaluation Date: 2025  Type of Evaluation: Initial  Physician Order: PT Eval and Treat    Presenting Problem: s/p R carotid endarterectomy with vein patch on 25  Co-Morbidities : chronic hepatitis C, HTN, PVD, RLE surgery  Reason for Therapy: Mobility Dysfunction and Discharge Planning    PHYSICAL THERAPY ASSESSMENT   Patient is a 70 year old male admitted 2025 for R endarterectomy with vein patch.   Patient is currently functioning near baseline with bed mobility, transfers, gait, and stair negotiation. Prior to admission, patient's baseline is independent with no AD.     Patient is functioning near baseline and does not have skilled PT needs upon DC.     PLAN  Patient has been evaluated and presents with no skilled Physical Therapy needs at this time.  Patient discharged from Physical Therapy services.  Please re-order if a new functional limitation presents during this admission.    PT Device Recommendation: None    GOALS  Patient was able to achieve the following goals ...    Patient was able to transfer Safely and independently   Patient able to ambulate on level surfaces Safely and independently     HOME SITUATION  Type of Home: House  Home Layout: Multi-level, Bed/bath upstairs                     Lives With: Spouse    Drives: Yes   Patient Regularly Uses: None     Prior Level of [place]: Pt is typically independent with ADLs, ambulates with no AD independently, drives, and works as a .     SUBJECTIVE  Pt cooperative during session with gentle encouragement     OBJECTIVE  Precautions: Bed/chair alarm (-145)  Fall Risk: Standard fall risk    WEIGHT BEARING RESTRICTION     PAIN ASSESSMENT  Ratin  Location: Incision sites  Management Techniques: Activity promotion, Relaxation, Repositioning    COGNITION  Overall Cognitive Status:  WFL - within functional limits    RANGE OF MOTION AND STRENGTH  ASSESSMENT  Upper extremity ROM and strength are within functional limits     Lower extremity ROM is within functional limits     Lower extremity strength is within functional limits     BALANCE  Static Sitting: Good  Dynamic Sitting: Good  Static Standing: Good  Dynamic Standing: Good    ADDITIONAL TESTS                                    ACTIVITY TOLERANCE   Vitals stable, pt denies dizziness                      O2 WALK       NEUROLOGICAL FINDINGS                        AM-PAC '6-Clicks' INPATIENT SHORT FORM - BASIC MOBILITY  How much difficulty does the patient currently have...  Patient Difficulty: Turning over in bed (including adjusting bedclothes, sheets and blankets)?: None   Patient Difficulty: Sitting down on and standing up from a chair with arms (e.g., wheelchair, bedside commode, etc.): None   Patient Difficulty: Moving from lying on back to sitting on the side of the bed?: None   How much help from another person does the patient currently need...   Help from Another: Moving to and from a bed to a chair (including a wheelchair)?: None   Help from Another: Need to walk in hospital room?: None   Help from Another: Climbing 3-5 steps with a railing?: A Little       AM-PAC Score:  Raw Score: 23   Approx Degree of Impairment: 11.2%   Standardized Score (AM-PAC Scale): 56.93   CMS Modifier (G-Code): CI    FUNCTIONAL ABILITY STATUS  Gait Assessment   Functional Mobility/Gait Assessment  Gait Assistance: Supervision, Independent  Distance (ft): 200  Assistive Device: None  Pattern: Within Functional Limits  Stairs: Stairs  How Many Stairs: 10  Device: 1 Rail  Assist: Supervision  Pattern: Ascend and Descend  Ascend and Descend : Reciprocal    Skilled Therapy Provided: Per RN okay to work with pt. Pt received in supine and was agreeable to PT session with gentle encouragement.     Bed Mobility:  Rolling: NT  Supine to sit: mod ind    Sit to supine: mod ind      Transfer Mobility:  Sit to stand: independent     Stand to sit: independent   Gait = pt ambulated with no AD and supervision that progressed to independent    Pt ambulated to/from the bathroom and in the olivas.    Pt stair climbed as above but was educated on step to pattern as needed for comfort and energy conservation.     Therapist's comments:Pt educated on role of therapy, goals for session, safety, fall prevention, energy conservation, surgical precautions, and activity recommendations.     Exercise/Education Provided:  Bed mobility  Energy conservation  Gait training  Strengthening  Transfer training    Patient End of Session: In bed, Needs met, Call light within reach, RN aware of session/findings, All patient questions and concerns addressed, Hospital anti-slip socks, Alarm set, Family present    Patient Evaluation Complexity Level:  History Moderate - 1 or 2 personal factors and/or co-morbidities   Examination of body systems Low -  addressing 1-2 elements   Clinical Presentation Low- Stable   Clinical Decision Making Low Complexity       PT Session Time: 23 minutes  Gait Trainin minutes

## 2025-05-24 LAB
ALBUMIN SERPL-MCNC: 4.3 G/DL (ref 3.2–4.8)
ALBUMIN/GLOB SERPL: 2 {RATIO} (ref 1–2)
ALP LIVER SERPL-CCNC: 44 U/L (ref 45–117)
ALT SERPL-CCNC: 7 U/L (ref 10–49)
ANION GAP SERPL CALC-SCNC: 6 MMOL/L (ref 0–18)
AST SERPL-CCNC: 21 U/L (ref ?–34)
ATRIAL RATE: 69 BPM
BILIRUB SERPL-MCNC: 0.6 MG/DL (ref 0.2–1.1)
BLOOD TYPE BARCODE: 9500
BUN BLD-MCNC: 7 MG/DL (ref 9–23)
CALCIUM BLD-MCNC: 9.3 MG/DL (ref 8.7–10.6)
CHLORIDE SERPL-SCNC: 106 MMOL/L (ref 98–112)
CO2 SERPL-SCNC: 28 MMOL/L (ref 21–32)
CREAT BLD-MCNC: 0.79 MG/DL (ref 0.7–1.3)
EGFRCR SERPLBLD CKD-EPI 2021: 97 ML/MIN/1.73M2 (ref 60–?)
ERYTHROCYTE [DISTWIDTH] IN BLOOD BY AUTOMATED COUNT: 15 %
GLOBULIN PLAS-MCNC: 2.2 G/DL (ref 2–3.5)
GLUCOSE BLD-MCNC: 106 MG/DL (ref 70–99)
HCT VFR BLD AUTO: 41.9 % (ref 39–53)
HGB BLD-MCNC: 14.3 G/DL (ref 13–17.5)
MCH RBC QN AUTO: 32.5 PG (ref 26–34)
MCHC RBC AUTO-ENTMCNC: 34.1 G/DL (ref 31–37)
MCV RBC AUTO: 95.2 FL (ref 80–100)
OSMOLALITY SERPL CALC.SUM OF ELEC: 288 MOSM/KG (ref 275–295)
P AXIS: 65 DEGREES
P-R INTERVAL: 162 MS
PLATELET # BLD AUTO: 214 10(3)UL (ref 150–450)
POTASSIUM SERPL-SCNC: 4 MMOL/L (ref 3.5–5.1)
PROT SERPL-MCNC: 6.5 G/DL (ref 5.7–8.2)
Q-T INTERVAL: 384 MS
QRS DURATION: 82 MS
QTC CALCULATION (BEZET): 411 MS
R AXIS: 4 DEGREES
RBC # BLD AUTO: 4.4 X10(6)UL (ref 3.8–5.8)
SODIUM SERPL-SCNC: 140 MMOL/L (ref 136–145)
T AXIS: 36 DEGREES
UNIT VOLUME: 350 ML
VENTRICULAR RATE: 69 BPM
WBC # BLD AUTO: 12.4 X10(3) UL (ref 4–11)

## 2025-05-24 PROCEDURE — 99233 SBSQ HOSP IP/OBS HIGH 50: CPT

## 2025-05-24 RX ORDER — ACETAMINOPHEN 325 MG/1
TABLET ORAL
Status: COMPLETED
Start: 2025-05-24 | End: 2025-05-24

## 2025-05-24 RX ORDER — HYDRALAZINE HYDROCHLORIDE 25 MG/1
25 TABLET, FILM COATED ORAL 3 TIMES DAILY
Qty: 90 TABLET | Refills: 3 | Status: SHIPPED | OUTPATIENT
Start: 2025-05-24 | End: 2025-05-25

## 2025-05-24 RX ORDER — AMLODIPINE BESYLATE 5 MG/1
5 TABLET ORAL DAILY
Qty: 30 TABLET | Refills: 0 | Status: SHIPPED | OUTPATIENT
Start: 2025-05-25 | End: 2025-05-25

## 2025-05-24 RX ORDER — FLUTICASONE PROPIONATE 50 MCG
1 SPRAY, SUSPENSION (ML) NASAL 2 TIMES DAILY PRN
Status: SHIPPED | COMMUNITY
Start: 2025-05-24 | End: 2025-05-30 | Stop reason: ALTCHOICE

## 2025-05-24 RX ORDER — HYDRALAZINE HYDROCHLORIDE 25 MG/1
25 TABLET, FILM COATED ORAL EVERY 8 HOURS SCHEDULED
Status: DISCONTINUED | OUTPATIENT
Start: 2025-05-24 | End: 2025-05-25

## 2025-05-24 RX ORDER — AMLODIPINE BESYLATE 5 MG/1
5 TABLET ORAL DAILY
Status: DISCONTINUED | OUTPATIENT
Start: 2025-05-24 | End: 2025-05-25

## 2025-05-24 RX ORDER — ACETAMINOPHEN 325 MG/1
650 TABLET ORAL EVERY 6 HOURS PRN
Status: DISCONTINUED | OUTPATIENT
Start: 2025-05-24 | End: 2025-05-25

## 2025-05-24 RX ORDER — AMLODIPINE BESYLATE 5 MG/1
5 TABLET ORAL ONCE
Status: COMPLETED | OUTPATIENT
Start: 2025-05-24 | End: 2025-05-24

## 2025-05-24 RX ORDER — CLOPIDOGREL BISULFATE 75 MG/1
75 TABLET ORAL DAILY
Qty: 90 TABLET | Refills: 0 | Status: SHIPPED | OUTPATIENT
Start: 2025-05-25

## 2025-05-24 RX ORDER — HYDRALAZINE HYDROCHLORIDE 20 MG/ML
5 INJECTION INTRAMUSCULAR; INTRAVENOUS ONCE
Status: COMPLETED | OUTPATIENT
Start: 2025-05-24 | End: 2025-05-24

## 2025-05-24 NOTE — DISCHARGE INSTRUCTIONS
No shower 2 days- can take steri-strips off in shower. No driving 7 days. No lifting more than 4 pounds 5 weeks. Call office for temperature> 100.5/ wound drainage

## 2025-05-24 NOTE — DISCHARGE SUMMARY
[facility]  Discharge Summary    Nico Harmon Patient Status:  Inpatient    1955 MRN [id number]   Location [facility] 4SW-A Attending Daniel Nina MD   Hosp Day # 2 PCP Lasha Fong MD     Date of Admission: 2025    Date of Discharge: 2025    Admitting Diagnosis: Right Carotid Stenosis  Carotid stenosis, right    Discharge Diagnosis:    Status post right carotid endarterectomy on 2025           On aspirin, Plavix and statin     2.  Chronic pain syndrome   3 . history of chronic hepatitis C, followed by gastroenterology  4.  History of hypogonadism on replacement therapy currently   5.  Hyperlipidemia on statin  6.  History of peripheral vascular disease  and peripheral neuropathy   7   Tobacco abuse       Problem List[1]    Reason for Admission: Right carotid stenosis    Physical Exam: See progress note        Hospital Course: Nico Harmon is a 70 year old male with with a medical history include hypertension hyperlipidemia peripheral vascular disease chronic hepatitis C type, peripheral neuropathy,  chronic pain syndrome and tobacco/ETOH dependence undergone right carotid endarterectomy by Dr. Nina for high-grade right carotid stenosis.  On 2025.  His  postop period was unremarkable.   He is discharged home in a stable condition with advised to follow-up with vascular surgery    Disposition: Home    Discharge Condition: Stable    Discharge Medications:   Current Discharge Medication List        START taking these medications    Details   clopidogrel 75 MG Oral Tab Take 1 tablet (75 mg total) by mouth daily.  Qty: 90 tablet, Refills: 0      amLODIPine 5 MG Oral Tab Take 1 tablet (5 mg total) by mouth daily.  Qty: 30 tablet, Refills: 0           CONTINUE these medications which have CHANGED    Details   diclofenac (VOLTAREN) 1 % External Gel Apply 2 g topically 4 (four) times daily as needed.      fluticasone propionate 50 MCG/ACT Nasal Suspension 1 spray by Each Nare  route 2 (two) times daily as needed.           CONTINUE these medications which have NOT CHANGED    Details   anastrozole 1 MG Oral Tab tab Take 0.5 tablets (0.5 mg total) by mouth twice a week.      Choline-Inositol-Methionine 50-50-25 MG/ML Intramuscular Solution Inject 1 mg into the muscle once a week.      NON FORMULARY Take 25 mg by mouth 3 (three) times a week. Enclomiphene Citrate      TESTOSTERONE IM Inject 75 mg into the muscle once a week.      losartan-hydroCHLOROthiazide 100-25 MG Oral Tab Take 1 tablet by mouth daily.  Qty: 90 tablet, Refills: 0      ROSUVASTATIN 5 MG Oral Tab TAKE 1 TABLET(5 MG) BY MOUTH EVERY NIGHT  Qty: 90 tablet, Refills: 0    Associated Diagnoses: Mixed hyperlipidemia      buprenorphine-naloxone 8-2 MG Sublingual Film Place 1 Film under the tongue daily.      aspirin 81 MG Oral Tab EC Take 1 tablet (81 mg total) by mouth in the morning.      testosterone cypionate 200 MG/ML Intramuscular Solution Inject 1 mL (200 mg total) into the muscle every 14 (fourteen) days. Use 18 gauge to draw and 22 G to inject.  Qty: 10 mL, Refills: 0    Associated Diagnoses: Low testosterone      ibuprofen 800 MG Oral Tab Take 1 tablet (800 mg total) by mouth as needed in the morning and 1 tablet (800 mg total) as needed at noon and 1 tablet (800 mg total) as needed in the evening.      SUMAtriptan Succinate (IMITREX) 100 MG Oral Tab Take 1 tablet (100 mg total) by mouth every 2 (two) hours as needed for Migraine. Use at onset; repeat once after 2 HRS-ONLY 2 IN 24 HR MAX  Qty: 9 tablet, Refills: 1      pregabalin 75 MG Oral Cap Take 1 capsule (75 mg total) by mouth in the morning and 1 capsule (75 mg total) before bedtime.      Sildenafil Citrate 50 MG Oral Tab Take 1 tablet (50 mg total) by mouth daily as needed for Erectile Dysfunction.  Qty: 30 tablet, Refills: 5             Follow up Visits: Follow-up with Physicians as directed.    Total time spent with the patient exceeded 40 minutes, with more  than half dedicated to counseling and coordination of care.    Lasha Fong MD  5/24/2025  12:30 PM         [1]   Patient Active Problem List  Diagnosis    Adhesive capsulitis of right shoulder    Rotator cuff tendonitis, right    Muscle spasm    Mixed hyperlipidemia    Chronic pain disorder    Overweight (BMI 25.0-29.9)    Elevated BP without diagnosis of hypertension    Peripheral vascular disease    Tobacco dependence due to cigarettes    Essential hypertension    Smokers' cough (HCC)    SOB (shortness of breath)    Dizziness    Carotid stenosis, right

## 2025-05-24 NOTE — PROGRESS NOTES
Nico Harmon Patient Status:  Inpatient    1955 MRN [id number]   Location [facility] 4SW-A Attending Daniel Nina MD   Hosp Day # 2 PCP Lasha Fong MD     Critical Care Progress Note          Subjective/24h events:  No overnight events. Received hydralazine x1 overnight.    Medications:  Scheduled Medications[1]           Intake/Output Summary (Last 24 hours) at 2025 0801  Last data filed at 2025 1800  Gross per 24 hour   Intake 500 ml   Output --   Net 500 ml       /64   Pulse (!) 47   Temp 98.5 °F (36.9 °C) (Temporal)   Resp (!) 9   Ht 167.6 cm (5' 6\")   Wt 153 lb 7 oz (69.6 kg)   SpO2 97%   BMI 24.77 kg/m²     Physical Exam:    General Appearance: Alert, cooperative, no acute distress, appears stated age  Neck: No JVD, no adenopathy, trachea midline, right surgical site with steristrips clean/dry/intact  Lungs: Clear to auscultation bilaterally, respirations unlabored  Heart: Regular rate and rhythm, S1 and S2 normal, no murmur, rub or gallop  Abdomen: Soft, non-tender, bowel sounds active all four quadrants, no masses, no organomegaly  Extremities: Atraumatic, no cyanosis or edema, capillary refill <3 sec.    Pulses: 2+ and symmetric all extremities  Skin: Skin color, texture, turgor normal for ethnicity, no rashes or lesions, warm and dry, right groin surgical site clean/dry/intact  Neurologic: Normal strength    Recent Labs   Lab 25  0354   RBC 4.40   HGB 14.3   HCT 41.9   MCV 95.2   MCH 32.5   MCHC 34.1   RDW 15.0   WBC 12.4*   .0     Recent Labs   Lab 25  1356 25  0512 25  0354   * 103* 106*   BUN 12 9 7*   CREATSERUM 1.01 0.75 0.79   CA 8.3* 7.6* 9.3   ALB  --  3.5 4.3    143 140   K 4.3 3.8 4.0   * 113* 106   CO2 24.0 25.0 28.0   ALKPHO  --  37* 44*   AST  --  23 21   ALT  --  9* 7*   BILT  --  0.5 0.6   TP  --  5.3* 6.5     Recent Labs   Lab 25  1402   ABGPHT 7.30*   HCFKCR7D 43   DTJGC6H 116*   ABGHCO3  20.9*   ABGBE -5.1*   TEMP 98.6   LEXUS Not Applicable   SITE Arterial Line   DEV Simple Oxygen Mask   THGB 12.8*     No results for input(s): \"BNP\" in the last 168 hours.  No results for input(s): \"TROP\", \"CK\" in the last 168 hours.    No results found.       Assessment/Plan:    Nico Harmon is a 70 year old male admitted to ICU 5/22 s/p right CEA with Dr. Nina.    Problems:  High grade right carotid stenosis s/p R CEA 5/22  Leukocytosis  H/o HTN  H/o HLD  H/o PVD/PAD  H/o Chronic hep C  H/o Migraines  H/o Daily tobacco/ETOH dependence   H/o opioid use        Neuro/Psych:  H/o Migraines  H/o Daily tobacco/ETOH dependence   H/o opioid use  -Avoid IV narcotics  -Scheduled tylenol  -Nicotine patch    Cardiovascular:  High grade right carotid stenosis s/p R CEA 5/22, POD#1  H/o HTN  H/o HLD  H/o PVD/PAD  -Cardene gtt off this morning, SBP goal 100-145  -Neurovascular checks per protocol  -ASA/Plavix  -Neurovascular surgery following  -BP management per cardiology     Pulmonary: On room air, encourage IS. No acute ICU issues.    GI:  H/o Chronic hep C  -Stable LFTs    Renal/Metabolic: Voiding adequately, no kidney injury. No acute ICU issues.    Heme: No anemia or thrombocytopenia, no acute ICU issues.    ID:  Leukocytosis-may be reactive post op  -WBC downtrending  -Afebrile  -s/p post op Ancef    Endocrine: No history of endocrinopathies. No acute ICU issues.        ICU checklist  Feeding: ADAT  Analgesia: PRN tylenol  Sedation: n/a  Thromboembolism PPX: SCD  Stress Ulcer PPX: n/a  Glucose control: <180  Bowel Regimen: PRN bowel regimen  Lines/drains/tubes: PIV  Deescalation of antibiotics: s/p Ancef  Therapies: PT/OT/ST when appropriate  Dispo: Possible discharge today if okay with vascular surgery    Code Status: Full Code    Plan of care discussed with intensivist, Dr. Mitchell Kirkland.      Evelin King, NEHAL-BC  Critical Care APRN  [id number]          [1]    rosuvastatin  5 mg Oral Nightly    nicotine  1 patch  Transdermal Daily    aspirin  81 mg Oral Daily    clopidogrel  75 mg Oral Daily

## 2025-05-24 NOTE — PROGRESS NOTES
Progress Note  Nico Harmon Patient Status:  Inpatient    1955 MRN [id number]   [facility] 4SW-A Attending Daniel Nina MD   Hosp Day # 2 PCP Lasha Fong MD     Subjective:  Resting comfortably. No complaints. Eager to go home.     Objective:  Physical Exam:   /71   Pulse 53   Temp 98 °F (36.7 °C) (Temporal)   Resp 13   Ht 5' 6\" (1.676 m)   Wt 153 lb 7 oz (69.6 kg)   SpO2 97%   BMI 24.77 kg/m²   Temp (24hrs), Av.7 °F (37.1 °C), Min:98 °F (36.7 °C), Max:99.1 °F (37.3 °C)       Intake/Output Summary (Last 24 hours) at 2025 1154  Last data filed at 2025 0800  Gross per 24 hour   Intake 1000 ml   Output --   Net 1000 ml     Wt Readings from Last 3 Encounters:   25 153 lb 7 oz (69.6 kg)   25 162 lb (73.5 kg)   24 157 lb (71.2 kg)     Telemetry:  General: Alert and oriented in no apparent distress  HEENT: No focal deficits.  Neck: No JVD. Dressing in place and stable.   Cardiac: Regular rate and rhythm, S1, S2 normal, rub or gallop.  Lungs: Clear without wheezes, rales, rhonchi or dullness.  Normal excursions and effort.  Abdomen: Soft, non-tender.   Extremities: Without clubbing, cyanosis or edema.  Neurologic: Alert and oriented, normal affect.  Skin: Warm and dry.        Intake/Output:    Intake/Output Summary (Last 24 hours) at 2025 1154  Last data filed at 2025 0800  Gross per 24 hour   Intake 1000 ml   Output --   Net 1000 ml       Last 3 Weights   25 0400 153 lb 7 oz (69.6 kg)   25 0500 161 lb 2.5 oz (73.1 kg)   25 1400 158 lb 15.2 oz (72.1 kg)   25 1438 160 lb (72.6 kg)   25 1154 162 lb (73.5 kg)   24 1008 157 lb (71.2 kg)       Labs:  Recent Labs   Lab 25  1356 25  0512 25  0354   * 103* 106*   BUN 12 9 7*   CREATSERUM 1.01 0.75 0.79   EGFRCR 81 98 97   CA 8.3* 7.6* 9.3    143 140   K 4.3 3.8 4.0   * 113* 106   CO2 24.0 25.0 28.0     Recent Labs   Lab  05/22/25  1355 05/23/25  0512 05/24/25  0354   RBC 4.40 4.16 4.40   HGB 14.2 13.5 14.3   HCT 41.6 40.0 41.9   MCV 94.5 96.2 95.2   MCH 32.3 32.5 32.5   MCHC 34.1 33.8 34.1   RDW 15.1 15.1 15.0   WBC 14.2* 13.8* 12.4*   .0 196.0 214.0         Recent Labs   Lab 05/22/25  1716   TROPHS 4       Diagnostics:             Medications:  Scheduled Medications[1]  Medication Infusions[2]    Assessment/Plan:    Carotid stenosis s/p elective carotid endarterectomy 5/22/25  On ASA, Plavix, statin  HTN  BP at goal  OP takes losartan hydrochlorothiazide 100/25mg  HLD  Rosuvastatin 5 mg daily  Alcohol misuse disorder  Tobacco misuse disorder  Congratulated on cessation 1 week ago  Hx of opioid use disorder  Reportedly doing well on suboxone  Hx of hepatitis C  S/p DAA treatment reports undetectable viral load post therapy    PLAN  Continue DAPT and statin  Cont losartan-hydrochlorothiazide home dose. BP still elevated, add amlodipine 5 mg every day.   Hopefully home today once bp is improved. Outpatient follow up.     Ubaldo Kirkland DO  Cardiologist  [facility] [place]  5/24/2025 11:55 AM      Note to the patient: The 21st Century Cures Act makes medical notes like these available to patients in the interest of transparency. However, be advised that this is a medical document. It is intended as peer to peer communication. It is written in medical language and may contain abbreviations or verbiage that are unfamiliar. It may appear blunt or direct. Medical documents are intended to carry relevant information, facts as evident, and clinical opinion of the practitioner.     Disclaimer: Components of this note were documented using voice recognition system and are subject to errors not corrected at proofreading. Contact the author of this note for any clarifications.          [1]    losartan (Cozaar) 100 mg, hydroCHLOROthiazide 25 mg for Hyzaar 100/25 ([facility] only)   Oral Daily    amLODIPine  5 mg Oral Daily     rosuvastatin  5 mg Oral Nightly    nicotine  1 patch Transdermal Daily    aspirin  81 mg Oral Daily    clopidogrel  75 mg Oral Daily   [2]    niCARdipine Stopped (05/23/25 0700)    phenylephrine

## 2025-05-24 NOTE — PLAN OF CARE
Received pt at change of shift alert and oriented x4.  C/o pain to R neck.  Site is soft without hematoma.  Steri strips intact.  Medicated with ultram Q 6 hours. Warm pack offered which provided some relief.  R thigh incision with dermabond C/D/I.  On room air.  SBP-150's-164.  Hydralazine given. Now SBP-144.  Up to bathroom to void.  No dizziness.  Neuros intact.  Hoping to go home this am.

## 2025-05-24 NOTE — PLAN OF CARE
Neuro WNL. Incisions c/d/i. Mild pain to R neck. PRN tramadol and tylenol. -170. Home meds restarted. No improvement. Per cards, start amlodipine. No improvement in BP even after second dose of amlodipine. Hydralazine started by cards. Due to ongoing BP issues, vascular and cards have agreed that patient will stay until BP under control. Likely dc home tomorrow pending BP improvement.

## 2025-05-25 VITALS
BODY MASS INDEX: 23.64 KG/M2 | HEART RATE: 78 BPM | DIASTOLIC BLOOD PRESSURE: 66 MMHG | OXYGEN SATURATION: 96 % | WEIGHT: 147.06 LBS | TEMPERATURE: 98 F | SYSTOLIC BLOOD PRESSURE: 117 MMHG | RESPIRATION RATE: 19 BRPM | HEIGHT: 66 IN

## 2025-05-25 LAB
ALBUMIN SERPL-MCNC: 4.5 G/DL (ref 3.2–4.8)
ALBUMIN/GLOB SERPL: 2 {RATIO} (ref 1–2)
ALP LIVER SERPL-CCNC: 47 U/L (ref 45–117)
ALT SERPL-CCNC: 10 U/L (ref 10–49)
ANION GAP SERPL CALC-SCNC: 8 MMOL/L (ref 0–18)
AST SERPL-CCNC: 22 U/L (ref ?–34)
BILIRUB SERPL-MCNC: 0.8 MG/DL (ref 0.2–1.1)
BUN BLD-MCNC: 9 MG/DL (ref 9–23)
CALCIUM BLD-MCNC: 9.7 MG/DL (ref 8.7–10.6)
CHLORIDE SERPL-SCNC: 106 MMOL/L (ref 98–112)
CO2 SERPL-SCNC: 26 MMOL/L (ref 21–32)
CREAT BLD-MCNC: 0.79 MG/DL (ref 0.7–1.3)
EGFRCR SERPLBLD CKD-EPI 2021: 97 ML/MIN/1.73M2 (ref 60–?)
ERYTHROCYTE [DISTWIDTH] IN BLOOD BY AUTOMATED COUNT: 14.9 %
GLOBULIN PLAS-MCNC: 2.3 G/DL (ref 2–3.5)
GLUCOSE BLD-MCNC: 112 MG/DL (ref 70–99)
HCT VFR BLD AUTO: 46.6 % (ref 39–53)
HGB BLD-MCNC: 16.1 G/DL (ref 13–17.5)
MCH RBC QN AUTO: 32.4 PG (ref 26–34)
MCHC RBC AUTO-ENTMCNC: 34.5 G/DL (ref 31–37)
MCV RBC AUTO: 93.8 FL (ref 80–100)
OSMOLALITY SERPL CALC.SUM OF ELEC: 289 MOSM/KG (ref 275–295)
PLATELET # BLD AUTO: 228 10(3)UL (ref 150–450)
POTASSIUM SERPL-SCNC: 3.8 MMOL/L (ref 3.5–5.1)
PROT SERPL-MCNC: 6.8 G/DL (ref 5.7–8.2)
RBC # BLD AUTO: 4.97 X10(6)UL (ref 3.8–5.8)
SODIUM SERPL-SCNC: 140 MMOL/L (ref 136–145)
WBC # BLD AUTO: 10.5 X10(3) UL (ref 4–11)

## 2025-05-25 PROCEDURE — 99233 SBSQ HOSP IP/OBS HIGH 50: CPT

## 2025-05-25 RX ORDER — AMLODIPINE BESYLATE 5 MG/1
10 TABLET ORAL DAILY
Status: DISCONTINUED | OUTPATIENT
Start: 2025-05-25 | End: 2025-05-25

## 2025-05-25 RX ORDER — AMLODIPINE BESYLATE 10 MG/1
10 TABLET ORAL DAILY
Qty: 30 TABLET | Refills: 3 | Status: SHIPPED | OUTPATIENT
Start: 2025-05-26

## 2025-05-25 NOTE — PROGRESS NOTES
Progress Note  Nico Harmon Patient Status:  Inpatient    1955 MRN [id number]   [facility] 4SW-A Attending Daniel Nina MD   Hosp Day # 3 PCP Lasha Fong MD     Subjective:  Sleeping. Complains of soreness to right upper inner thigh near incision site.     Objective:  Physical Exam:   /45   Pulse (!) 46   Temp 97.6 °F (36.4 °C) (Temporal)   Resp 11   Ht 5' 6\" (1.676 m)   Wt 147 lb 0.8 oz (66.7 kg)   SpO2 98%   BMI 23.73 kg/m²   Temp (24hrs), Av °F (36.7 °C), Min:97.6 °F (36.4 °C), Max:98.3 °F (36.8 °C)       Intake/Output Summary (Last 24 hours) at 2025 0734  Last data filed at 2025 1200  Gross per 24 hour   Intake 740 ml   Output --   Net 740 ml     Wt Readings from Last 3 Encounters:   25 147 lb 0.8 oz (66.7 kg)   25 162 lb (73.5 kg)   24 157 lb (71.2 kg)     Telemetry:  General: Alert and oriented in no apparent distress  HEENT: No focal deficits.  Neck: No JVD. Dressing in place and stable.   Cardiac: Regular rate and rhythm, S1, S2 normal, rub or gallop.  Lungs: Clear without wheezes, rales, rhonchi or dullness.  Normal excursions and effort.  Abdomen: Soft, non-tender.   Extremities: Without clubbing, cyanosis or edema. Right inner thigh incision intact - small hematoma present.   Neurologic: Alert and oriented, normal affect.  Skin: Warm and dry.        Intake/Output:    Intake/Output Summary (Last 24 hours) at 2025 0734  Last data filed at 2025 1200  Gross per 24 hour   Intake 740 ml   Output --   Net 740 ml       Last 3 Weights   25 0600 147 lb 0.8 oz (66.7 kg)   25 0400 153 lb 7 oz (69.6 kg)   25 0500 161 lb 2.5 oz (73.1 kg)   25 1400 158 lb 15.2 oz (72.1 kg)   25 1438 160 lb (72.6 kg)   25 1154 162 lb (73.5 kg)   24 1008 157 lb (71.2 kg)       Labs:  Recent Labs   Lab 25  0512 25  0354 25  0330   * 106* 112*   BUN 9 7* 9   CREATSERUM 0.75 0.79 0.79    EGFRCR 98 97 97   CA 7.6* 9.3 9.7    140 140   K 3.8 4.0 3.8   * 106 106   CO2 25.0 28.0 26.0     Recent Labs   Lab 05/23/25  0512 05/24/25  0354 05/25/25  0330   RBC 4.16 4.40 4.97   HGB 13.5 14.3 16.1   HCT 40.0 41.9 46.6   MCV 96.2 95.2 93.8   MCH 32.5 32.5 32.4   MCHC 33.8 34.1 34.5   RDW 15.1 15.0 14.9   WBC 13.8* 12.4* 10.5   .0 214.0 228.0         Recent Labs   Lab 05/22/25  1716   TROPHS 4       Diagnostics:             Medications:  Scheduled Medications[1]  Medication Infusions[2]    Assessment/Plan:    Carotid stenosis s/p elective carotid endarterectomy 5/22/25  On ASA, Plavix, statin  HTN  BP at goal  OP takes losartan hydrochlorothiazide 100/25mg  HLD  Rosuvastatin 5 mg daily  Alcohol misuse disorder  Tobacco misuse disorder  Congratulated on cessation 1 week ago  Hx of opioid use disorder  Reportedly doing well on suboxone  Hx of hepatitis C  S/p DAA treatment reports undetectable viral load post therapy    PLAN  Continue DAPT and statin  Cont losartan-hydrochlorothiazide home dose.   Cont added amlodipine 10 mg every day.   BP well controlled overnight. Hold hydralazine for now - add if BP elevated.   CV surgery to assess incisional site/hematoma - appears stable   Hopefully home today once bp is improved. Outpatient follow up.     Ubaldo Kirkland DO  Cardiologist  [facility] [place]  5/25/2025  7:35 AM      Note to the patient: The 21st Century Cures Act makes medical notes like these available to patients in the interest of transparency. However, be advised that this is a medical document. It is intended as peer to peer communication. It is written in medical language and may contain abbreviations or verbiage that are unfamiliar. It may appear blunt or direct. Medical documents are intended to carry relevant information, facts as evident, and clinical opinion of the practitioner.     Disclaimer: Components of this note were documented using voice recognition system  and are subject to errors not corrected at proofreading. Contact the author of this note for any clarifications.          [1]    amLODIPine  10 mg Oral Daily    losartan (Cozaar) 100 mg, hydroCHLOROthiazide 25 mg for Hyzaar 100/25 ([facility] only)   Oral Daily    hydrALAZINE  25 mg Oral Q8H DAVID    rosuvastatin  5 mg Oral Nightly    nicotine  1 patch Transdermal Daily    aspirin  81 mg Oral Daily    clopidogrel  75 mg Oral Daily   [2]    niCARdipine Stopped (05/23/25 0700)    phenylephrine

## 2025-05-25 NOTE — PROGRESS NOTES
Nico Harmon Patient Status:  Inpatient    1955 MRN [id number]   Location [facility] 4SW-A Attending Daniel Nina MD   Hosp Day # 3 PCP Lasha Fong MD     Critical Care Progress Note          Subjective/24h events:  No overnight events.     Medications:  Scheduled Medications[1]           Intake/Output Summary (Last 24 hours) at 2025 0803  Last data filed at 2025 1200  Gross per 24 hour   Intake 240 ml   Output --   Net 240 ml       /48   Pulse (!) 46   Temp 97.6 °F (36.4 °C) (Temporal)   Resp 12   Ht 167.6 cm (5' 6\")   Wt 147 lb 0.8 oz (66.7 kg)   SpO2 97%   BMI 23.73 kg/m²     Physical Exam:    General Appearance: Alert, cooperative, no acute distress, appears stated age  Neck: No JVD, no adenopathy, trachea midline, right neck surgical site with steristrips clean/dry/intact  Lungs: Clear to auscultation bilaterally, respirations unlabored  Heart: Regular rate and rhythm, S1 and S2 normal, no murmur, rub or gallop  Abdomen: Soft, non-tender, bowel sounds active all four quadrants, no masses, no organomegaly  Extremities: Atraumatic, no cyanosis or edema, capillary refill <3 sec.    Pulses: 2+ and symmetric all extremities  Skin: Skin color, texture, turgor normal for ethnicity, no rashes or lesions, warm and dry, right groin surgical site clean/dry/intact  Neurologic: Normal strength    Recent Labs   Lab 25  0330   RBC 4.97   HGB 16.1   HCT 46.6   MCV 93.8   MCH 32.4   MCHC 34.5   RDW 14.9   WBC 10.5   .0     Recent Labs   Lab 25  0512 25  0354 25  0330   * 106* 112*   BUN 9 7* 9   CREATSERUM 0.75 0.79 0.79   CA 7.6* 9.3 9.7   ALB 3.5 4.3 4.5    140 140   K 3.8 4.0 3.8   * 106 106   CO2 25.0 28.0 26.0   ALKPHO 37* 44* 47   AST 23 21 22   ALT 9* 7* 10   BILT 0.5 0.6 0.8   TP 5.3* 6.5 6.8     Recent Labs   Lab 25  1402   ABGPHT 7.30*   JZQUCJ6K 43   SBEYK0V 116*   ABGHCO3 20.9*   ABGBE -5.1*   TEMP 98.6   LEXUS Not  Applicable   SITE Arterial Line   DEV Simple Oxygen Mask   THGB 12.8*     No results for input(s): \"BNP\" in the last 168 hours.  No results for input(s): \"TROP\", \"CK\" in the last 168 hours.    No results found.       Assessment/Plan:    Nico Harmon is a 70 year old male admitted to ICU 5/22 s/p right CEA with Dr. Nina.    Problems:  High grade right carotid stenosis s/p R CEA 5/22  Leukocytosis  H/o HTN  H/o HLD  H/o PVD/PAD  H/o Chronic hep C  H/o Migraines  H/o Daily tobacco/ETOH dependence   H/o opioid use        Neuro/Psych:  H/o Migraines  H/o Daily tobacco/ETOH dependence   H/o opioid use  -Avoid IV narcotics  -Scheduled tylenol  -Nicotine patch    Cardiovascular:  High grade right carotid stenosis s/p R CEA 5/22, POD#1  H/o HTN  H/o HLD  H/o PVD/PAD  -SBP goal 100-145  -Neurovascular checks per protocol  -ASA/Plavix  -Neurovascular surgery following  -Discussed BP management with cardiology at bedside. Continue Cozaar and 10mg amlodipine. Continue Hydralazine PO if needed, otherwise ok to dc without if SBP within goal    Pulmonary: On room air, encourage IS. No acute ICU issues.    GI:  H/o Chronic hep C  -Stable LFTs    Renal/Metabolic: Voiding adequately, no kidney injury. No acute ICU issues.    Heme: No anemia or thrombocytopenia, no acute ICU issues.    ID:  Leukocytosis-improved  -WBC 10.5  -Afebrile  -s/p post op Ancef    Endocrine: No history of endocrinopathies. No acute ICU issues.        ICU checklist  Feeding: ADAT  Analgesia: PRN tylenol  Sedation: n/a  Thromboembolism PPX: SCD  Stress Ulcer PPX: n/a  Glucose control: <180  Bowel Regimen: PRN bowel regimen  Lines/drains/tubes: PIV  Deescalation of antibiotics: s/p Ancef  Therapies: PT/OT/ST when appropriate  Dispo: Discharge today if SBP<145    Code Status: Full Code    Plan of care discussed with intensivist, Dr. Mitchell Kirkland.      Evelin King, [facility]  Critical Care APRN  [id number]          [1]    amLODIPine  10 mg Oral Daily    losartan  (Cozaar) 100 mg, hydroCHLOROthiazide 25 mg for Hyzaar 100/25 ([facility] only)   Oral Daily    hydrALAZINE  25 mg Oral Q8H DAVID    rosuvastatin  5 mg Oral Nightly    nicotine  1 patch Transdermal Daily    aspirin  81 mg Oral Daily    clopidogrel  75 mg Oral Daily

## 2025-05-25 NOTE — PLAN OF CARE
Received pt at change of shift.  States pain is less.   Took tylenol and ultram at bedtime and slept well.  Upon waking in am c/o R thigh pain and swelling.  Inner aspect in hard, tender and swollen.  Tylenol given.  SBP up to 159 max, but schedule hydralazine given and now BP-128-145.  Up to bathroom independently. Hoping to go home today.

## 2025-05-25 NOTE — PLAN OF CARE
Patient received around 0700. Patient is axox4. Room air. NSR. Hydralazine held, per terri hagen, and BP stable. Up to bathroom with standby assist. Discharge order in, all MDS aware and agree to DC. Kelle said okay to leave before he sees him today. Wife at bedside. DC instructions understood.

## 2025-05-27 NOTE — DISCHARGE SUMMARY
[facility]    PATIENT'S NAME: HOANG GARCIA   ATTENDING PHYSICIAN: Daniel Nina M.D.   PATIENT ACCOUNT#:   [id number]    LOCATION:  [address] [place] [address]  MEDICAL RECORD #:   [id number]       YOB: 1955  ADMISSION DATE:       05/22/2025      DISCHARGE DATE:  05/25/2025    DISCHARGE SUMMARY    HISTORY AND HOSPITAL COURSE:  A 70-year-old white male, underwent a high-grade right carotid artery stenosis with very poor backbleeding.  Wounds are clean and dry.  Neurologically, he is intact.  Trachea is midline.  Cranial nerves are intact.  He is off blood pressure medications.  Stressed the importance of no smoking.  Discussed the surgery.     DISCHARGE INSTRUCTIONS:  Will do a duplex ultrasound in 1 month.  Discharge home for today if okay with other doctors and follow up in 1 week.  Instructed on wound care, activity, and followup.    Dictated By Daniel Nina M.D.  d: 05/24/2025 07:52:42  t: 05/24/2025 08:22:37  [facility] [id number]  FRANK/

## 2025-05-27 NOTE — PAYOR COMM NOTE
--------------  DISCHARGE REVIEW    Payor: [employer]  Subscriber #:  [id number]  Authorization Number: [id number]    Admit date: 5/22/25  Admit time:   8:34 AM  Discharge Date: 5/25/2025  2:36 PM     Admitting Physician: Daniel Nina MD  Attending Physician:  No att. providers found  Primary Care Physician: Lasha Fong MD          Discharge Summary Notes        Discharge Summary filed by Daniel Nina MD at 5/27/2025  7:16 AM / Draft: Not Electronically Signed       Author: Daniel Nina MD Specialty: SURGERY, VASCULAR, SURGERY, CARDIOVASCULAR, Surgery, Thoracic Author Type: Physician    Filed: 5/27/2025  7:16 AM Status: Unsigned Transcription    : Daniel Nina MD (Physician)           DISCHARGE SUMMARY    HOANG GARCIA [id number]   YOB: 1955 [id number]         [facility]    PATIENT'S NAME: HOANG GARCIA   ATTENDING PHYSICIAN: Daniel Nina M.D.   PATIENT ACCOUNT#:   [id number]    LOCATION:  [address] [place] [address]  MEDICAL RECORD #:   [id number]       YOB: 1955  ADMISSION DATE:       05/22/2025      DISCHARGE DATE:  05/25/2025    DISCHARGE SUMMARY    HISTORY AND HOSPITAL COURSE:  A 70-year-old white male, underwent a high-grade right carotid artery stenosis with very poor backbleeding.  Wounds are clean and dry.  Neurologically, he is intact.  Trachea is midline.  Cranial nerves are intact.  He is off blood pressure medications.  Stressed the importance of no smoking.  Discussed the surgery.     DISCHARGE INSTRUCTIONS:  Will do a duplex ultrasound in 1 month.  Discharge home for today if okay with other doctors and follow up in 1 week.  Instructed on wound care, activity, and followup.    Dictated By Daniel Nina M.D.  d: 05/24/2025 07:52:42  t: 05/24/2025 08:22:37  Job [id number]  BAO/           REVIEWER COMMENTS

## 2025-07-18 NOTE — Clinical Note
Please have me send testosterone prescription to [facility] for this patient. 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
84

## (undated) DIAGNOSIS — R79.89 LOW TESTOSTERONE: Primary | ICD-10-CM

## (undated) DEVICE — SOLUTION IRRIG 1000ML 0.9% NACL USP BTL

## (undated) DEVICE — ZZ-CONVERTED-TO-522442- SPONGE 4X4 10PK

## (undated) DEVICE — Device: Brand: INTELLICART™

## (undated) DEVICE — CATHETER URETH AD 20FR L16IN YEL RUB RND TIP

## (undated) DEVICE — SUNDT™ EXTERNAL CAROTID ENDARTERECTOMY SHUNT
Type: IMPLANTABLE DEVICE | Status: NON-FUNCTIONAL
Brand: SUNDT™
Removed: 2025-05-22

## (undated) DEVICE — SUT DEKNATEL POLYDEK 4-0 12XL30IN GRN POLY

## (undated) DEVICE — UNDYED BRAIDED (POLYGLACTIN 910), SYNTHETIC ABSORBABLE SUTURE: Brand: COATED VICRYL

## (undated) DEVICE — STERILE POLYISOPRENE POWDER-FREE SURGICAL GLOVES: Brand: PROTEXIS

## (undated) DEVICE — 3M™ BAIR HUGGER® UNDERBODY BLANKET, FULL ACCESS, 10 PER CASE 63500: Brand: BAIR HUGGER™

## (undated) DEVICE — SUT PROL 7-0 24IN BV-1 NABSRB BLU 9.3MM 3/8 C

## (undated) DEVICE — TOWEL,OR,DSP,ST,BLUE,STD,4/PK,20PK/CS: Brand: MEDLINE

## (undated) DEVICE — SUT VCRL 2-0 36IN CT-1 ABSRB UD L36MM 1/2 CIR

## (undated) DEVICE — SLEEVE COMPR MD KNEE LEN SGL USE KENDALL SCD

## (undated) DEVICE — TRAY CATH 16FR F INCL BARDX IC COMPLT CARE

## (undated) DEVICE — SUT ETHBND XL 3-0 30IN SH NABSRB GRN 26MM 1/2

## (undated) DEVICE — ZZ-CONVERTED-TO-524825-ADHESIVE SKIN TOP FOR WND CLSR DERMBND ADV

## (undated) DEVICE — INTENDED TO BE USED TO OCCLUDE, RETRACT AND IDENTIFY ARTERIES, VEINS, TENDONS AND NERVES IN SURGICAL PROCEDURES: Brand: STERION®  VESSEL LOOP

## (undated) DEVICE — STANDARD HYPODERMIC NEEDLE,POLYPROPYLENE HUB: Brand: MONOJECT

## (undated) DEVICE — ANTIBACTERIAL UNDYED BRAIDED (POLYGLACTIN 910), SYNTHETIC ABSORBABLE SUTURE: Brand: COATED VICRYL

## (undated) DEVICE — 3M™ IOBAN™ 2 ANTIMICROBIAL INCISE DRAPE 6650EZ: Brand: IOBAN™ 2

## (undated) DEVICE — 3M™ STERI-STRIP™ REINFORCED ADHESIVE SKIN CLOSURES, R1547, 1/2 IN X 4 IN (12 MM X 100 MM), 6 STRIPS/ENVELOPE: Brand: 3M™ STERI-STRIP™

## (undated) DEVICE — SUT PROL 5-0 24IN NABSRB BLU 13MM C-1 3/8

## (undated) DEVICE — SOLUTION IV 500ML 0.9% NACL FLX CONT

## (undated) DEVICE — APPLICATOR PREP 26ML CHG 2% ISO ALC 70%

## (undated) DEVICE — SUT PROL 6-0 24IN C-1 NABSRB BLU 13MM 3/8 CIR

## (undated) DEVICE — 3M™ TEGADERM™ TRANSPARENT FILM DRESSING FRAME STYLE, 1626W, 4 IN X 4-3/4 IN (10 CM X 12 CM), 50/CT 4CT/CASE: Brand: 3M™ TEGADERM™

## (undated) DEVICE — PACK CV CUSTOM

## (undated) DEVICE — CLIP LIG M BLU TI HRT SHP WRE HORZ 600 PER BX

## (undated) DEVICE — GEL US 20 GM PKT ST AQSNC 100

## (undated) NOTE — Clinical Note
Dear Dr. Fong,  I had the opportunity to see your patient Nico Harmon for an EMG recently. I appreciate your confidence in me to care for your patients. Please feel free call me with any questions at [phone] or contact me through Epic.  Sincerely, Maulik Anna MD Board Certified, Physical Medicine and Rehabilitation Specializing in Sports Medicine, Spine Medicine and Electrodiagnostic Medicine [facility][1]

## (undated) NOTE — LETTER
Jayme Jones M.D., F.A.C.S.  Jorgito He M.D., F.A.C.S.  Alec Westbrook M.D.   Saleem Granados M.D., F.A.C.S.  CHRISTY Dodson M.D., F.A.C.S.   Wilfrido Barton M.D., F.A.C.S.  Luis Oneal M.D.    CHRISTY Lewis M.D.   CHRISTY Del Toro M.D., M.B.A.  Christopher Camp M.D.  CHRISTY Arce M.D., F.A.C.S.  Cameron Junior M.D., F.A.C.S.   CHRISTY Melo M.D., F.A.C.S., F.A.C.C. Paul Fagan M.D.  Aramis Ovalle M.D.    BHUPENDRA Alan D.O., F.A.C.S.  Thomas Dunlap M.D.   Haseeb Luna M.D.  Elder Valdez M.D.    Daniel Nina M.D., F.A.C.S.        Welcome to [facility]    As you contemplate possible surgical treatment, it is very important to us that you understand fully what is being discussed, that all of your questions have been answered, and that your options for treatment have been fully explained.    To that end, on the following page we will ask you some questions to make certain that you understand everything which has been explained to you. Included in this understanding is that there are both surgical and nonsurgical treatments available for you, that you have options regarding where your care is given, and what doctors are involved in your care. Included in these options would, of course, be the option to elect for no treatment whatsoever. We especially want to be sure that you have had a chance to have all of your questions and concerns answered. If there are any issues which have not been adequately addressed, we ask you to bring them forward so that we can thoroughly address them.    A patient who is fully informed and understands their condition and options for treatment, as well as potential adverse effects of treatment, is going to be a patient who receives the most benefit out of care rendered.  Our goal in addition to providing excellent surgical care is to provide the necessary information to you and your family in order to make decisions which are appropriate relative to your own care.    Please take the time necessary to read and answer the questions on the next page. Again, if you have any questions, bring them forward and we will certainly address them.    Sincerely,    [facility]    Date:___________________ _______________________ _______________________       Printed Patient Name  Signature of Patient    Date:___________________ _______________________ _______________________       Printed Witness Name  Signature of Witness    _______________________       Relationship of Witness to Patient        Consent Form Page: 1 of 2  [id number] [facility] * [facility] [address] * [place] [address] * [phone] / [phone] *  Fax [phone] * Billing Fax [phone] Version: 9/9/2024    [facility]  Supplemental Consent Form    A [facility] surgeon has met with me and explained the matter of my illness, and what treatments might be available to improve my condition. As a result of that conversation, I understand the following:    A [facility] surgeon met with me and explained, in detail, the nature of my condition for which surgery is being contemplated. The procedure being performed is:  RIGHT CAROTID ENDARTERECTOMY WITH VEIN PATCH     Yes _____ No _____    A [facility] surgeon met with me and explained to me that there are alternatives to surgery which may include no surgery, medical therapy, or interventional treatment, among other options and the risks and benefits of the different treatment options:Yes _____ No _____    A [facility] surgeon has explained to me that if I should desire, he/she is willing to explain my case and the surgical and non-surgical options to family members:            Yes _____ No _____    A [facility] surgeon has answered all of my  questions regarding the topics we have discussed. I have been invited to ask more questions:  Yes _____ No _____    A [facility] surgeon has explained to me that if I seek other options or wish treatment at elsewhere, that his/her office will assist me in making such recommendations:  Yes _____ No _____    A [facility] surgeon has explained to me that death, risk of bleeding, stroke, multi-organ failure, heart attack and/or other complications are risks for the proposed surgical procedure:        Yes _____ No _____    A [facility] surgeon has explained to me that I have the right to cancel or postpone the surgery at any time prior to the start of surgery:    Yes _____ No _____    The nature and options for treatment for my condition has been explained to me, in detail, by a [facility] surgeon and all questions have been answered to my satisfaction. I understand that I am not required to undergo surgery, and further, that if I so desire, I could have surgery accomplished by another surgeon or at another institution. I understand and accept that which has been explained to me. I am able to make my decisions knowingly and willingly based on the data.    Date:___________________ _______________________ _______________________       Printed Patient Name  Signature of Patient    Date:___________________ _______________________ _______________________       Printed Witness Name  Signature of Witness    _______________________   Relationship of Witness to Patient          Consent Form Page: 3 of  2  [address] [facility] * [address] * [place] [address] * [phone] / [phone] *  Fax [phone] * Diana Fax [phone] Version: 9/9/2024